# Patient Record
Sex: FEMALE | Race: WHITE | NOT HISPANIC OR LATINO | Employment: FULL TIME | URBAN - METROPOLITAN AREA
[De-identification: names, ages, dates, MRNs, and addresses within clinical notes are randomized per-mention and may not be internally consistent; named-entity substitution may affect disease eponyms.]

---

## 2018-07-20 ENCOUNTER — OFFICE VISIT (OUTPATIENT)
Dept: URGENT CARE | Facility: CLINIC | Age: 33
End: 2018-07-20
Payer: COMMERCIAL

## 2018-07-20 VITALS
RESPIRATION RATE: 16 BRPM | HEART RATE: 92 BPM | HEIGHT: 69 IN | DIASTOLIC BLOOD PRESSURE: 84 MMHG | SYSTOLIC BLOOD PRESSURE: 118 MMHG | BODY MASS INDEX: 40.35 KG/M2 | WEIGHT: 272.4 LBS | OXYGEN SATURATION: 100 % | TEMPERATURE: 97.4 F

## 2018-07-20 DIAGNOSIS — R07.89 CHEST TIGHTNESS: ICD-10-CM

## 2018-07-20 DIAGNOSIS — R05.9 COUGH: ICD-10-CM

## 2018-07-20 PROCEDURE — 99203 OFFICE O/P NEW LOW 30 MIN: CPT | Performed by: NURSE PRACTITIONER

## 2018-07-20 RX ORDER — ALBUTEROL SULFATE 90 UG/1
2 AEROSOL, METERED RESPIRATORY (INHALATION) EVERY 6 HOURS PRN
Qty: 8.5 G | Refills: 1 | Status: SHIPPED | OUTPATIENT
Start: 2018-07-20 | End: 2021-06-16 | Stop reason: SDUPTHER

## 2018-07-20 ASSESSMENT — ENCOUNTER SYMPTOMS
WHEEZING: 1
FEVER: 0
SHORTNESS OF BREATH: 1
CHILLS: 0
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
## 2021-05-06 ENCOUNTER — TELEPHONE (OUTPATIENT)
Dept: SCHEDULING | Facility: IMAGING CENTER | Age: 36
End: 2021-05-06

## 2021-05-07 NOTE — TELEPHONE ENCOUNTER
Letter for medical records is being sent. I will keep a copy of her request just in case a signature is needed.

## 2021-05-25 ENCOUNTER — TELEPHONE (OUTPATIENT)
Dept: MEDICAL GROUP | Facility: PHYSICIAN GROUP | Age: 36
End: 2021-05-25

## 2021-05-27 ENCOUNTER — HOSPITAL ENCOUNTER (OUTPATIENT)
Dept: LAB | Facility: MEDICAL CENTER | Age: 36
End: 2021-05-27
Attending: NURSE PRACTITIONER
Payer: COMMERCIAL

## 2021-05-27 ENCOUNTER — OFFICE VISIT (OUTPATIENT)
Dept: MEDICAL GROUP | Facility: PHYSICIAN GROUP | Age: 36
End: 2021-05-27
Payer: COMMERCIAL

## 2021-05-27 VITALS
BODY MASS INDEX: 43.4 KG/M2 | WEIGHT: 293 LBS | HEART RATE: 78 BPM | RESPIRATION RATE: 16 BRPM | OXYGEN SATURATION: 100 % | TEMPERATURE: 97 F | HEIGHT: 69 IN | DIASTOLIC BLOOD PRESSURE: 80 MMHG | SYSTOLIC BLOOD PRESSURE: 126 MMHG

## 2021-05-27 DIAGNOSIS — J30.2 SEASONAL ALLERGIES: ICD-10-CM

## 2021-05-27 DIAGNOSIS — Z76.89 ENCOUNTER TO ESTABLISH CARE: ICD-10-CM

## 2021-05-27 DIAGNOSIS — E66.01 MORBID OBESITY WITH BMI OF 40.0-44.9, ADULT (HCC): ICD-10-CM

## 2021-05-27 DIAGNOSIS — J45.20 MILD INTERMITTENT ASTHMA WITHOUT COMPLICATION: ICD-10-CM

## 2021-05-27 LAB
ALBUMIN SERPL BCP-MCNC: 4.3 G/DL (ref 3.2–4.9)
ALBUMIN/GLOB SERPL: 1.5 G/DL
ALP SERPL-CCNC: 82 U/L (ref 30–99)
ALT SERPL-CCNC: 27 U/L (ref 2–50)
ANION GAP SERPL CALC-SCNC: 10 MMOL/L (ref 7–16)
AST SERPL-CCNC: 23 U/L (ref 12–45)
BASOPHILS # BLD AUTO: 0.5 % (ref 0–1.8)
BASOPHILS # BLD: 0.03 K/UL (ref 0–0.12)
BILIRUB SERPL-MCNC: 0.5 MG/DL (ref 0.1–1.5)
BUN SERPL-MCNC: 13 MG/DL (ref 8–22)
CALCIUM SERPL-MCNC: 9.2 MG/DL (ref 8.5–10.5)
CHLORIDE SERPL-SCNC: 108 MMOL/L (ref 96–112)
CHOLEST SERPL-MCNC: 145 MG/DL (ref 100–199)
CO2 SERPL-SCNC: 24 MMOL/L (ref 20–33)
CREAT SERPL-MCNC: 0.8 MG/DL (ref 0.5–1.4)
EOSINOPHIL # BLD AUTO: 0.12 K/UL (ref 0–0.51)
EOSINOPHIL NFR BLD: 1.9 % (ref 0–6.9)
ERYTHROCYTE [DISTWIDTH] IN BLOOD BY AUTOMATED COUNT: 46.7 FL (ref 35.9–50)
EST. AVERAGE GLUCOSE BLD GHB EST-MCNC: 100 MG/DL
FASTING STATUS PATIENT QL REPORTED: NORMAL
GLOBULIN SER CALC-MCNC: 2.9 G/DL (ref 1.9–3.5)
GLUCOSE SERPL-MCNC: 82 MG/DL (ref 65–99)
HBA1C MFR BLD: 5.1 % (ref 4–5.6)
HCT VFR BLD AUTO: 43.1 % (ref 37–47)
HDLC SERPL-MCNC: 44 MG/DL
HGB BLD-MCNC: 14.2 G/DL (ref 12–16)
IMM GRANULOCYTES # BLD AUTO: 0.03 K/UL (ref 0–0.11)
IMM GRANULOCYTES NFR BLD AUTO: 0.5 % (ref 0–0.9)
LDLC SERPL CALC-MCNC: 90 MG/DL
LYMPHOCYTES # BLD AUTO: 2.03 K/UL (ref 1–4.8)
LYMPHOCYTES NFR BLD: 32.1 % (ref 22–41)
MCH RBC QN AUTO: 28.4 PG (ref 27–33)
MCHC RBC AUTO-ENTMCNC: 32.9 G/DL (ref 33.6–35)
MCV RBC AUTO: 86.2 FL (ref 81.4–97.8)
MONOCYTES # BLD AUTO: 0.44 K/UL (ref 0–0.85)
MONOCYTES NFR BLD AUTO: 7 % (ref 0–13.4)
NEUTROPHILS # BLD AUTO: 3.67 K/UL (ref 2–7.15)
NEUTROPHILS NFR BLD: 58 % (ref 44–72)
NRBC # BLD AUTO: 0 K/UL
NRBC BLD-RTO: 0 /100 WBC
PLATELET # BLD AUTO: 313 K/UL (ref 164–446)
PMV BLD AUTO: 11.1 FL (ref 9–12.9)
POTASSIUM SERPL-SCNC: 4.6 MMOL/L (ref 3.6–5.5)
PROT SERPL-MCNC: 7.2 G/DL (ref 6–8.2)
RBC # BLD AUTO: 5 M/UL (ref 4.2–5.4)
SODIUM SERPL-SCNC: 142 MMOL/L (ref 135–145)
TRIGL SERPL-MCNC: 54 MG/DL (ref 0–149)
TSH SERPL DL<=0.005 MIU/L-ACNC: 2.43 UIU/ML (ref 0.38–5.33)
WBC # BLD AUTO: 6.3 K/UL (ref 4.8–10.8)

## 2021-05-27 PROCEDURE — 80061 LIPID PANEL: CPT

## 2021-05-27 PROCEDURE — 85025 COMPLETE CBC W/AUTO DIFF WBC: CPT

## 2021-05-27 PROCEDURE — 80053 COMPREHEN METABOLIC PANEL: CPT

## 2021-05-27 PROCEDURE — 84443 ASSAY THYROID STIM HORMONE: CPT

## 2021-05-27 PROCEDURE — 36415 COLL VENOUS BLD VENIPUNCTURE: CPT

## 2021-05-27 PROCEDURE — 99214 OFFICE O/P EST MOD 30 MIN: CPT | Performed by: NURSE PRACTITIONER

## 2021-05-27 PROCEDURE — 83036 HEMOGLOBIN GLYCOSYLATED A1C: CPT

## 2021-05-27 ASSESSMENT — PATIENT HEALTH QUESTIONNAIRE - PHQ9: CLINICAL INTERPRETATION OF PHQ2 SCORE: 0

## 2021-05-27 NOTE — ASSESSMENT & PLAN NOTE
Chronic medical problem.  She is taking cetirizine 10 mg daily.  She will switch up her allergy medications throughout the year.

## 2021-05-27 NOTE — ASSESSMENT & PLAN NOTE
Chronic medical problem.  She started weight watchers in January. She has been able to lose 24 pounds.  When she exercises she is doing walking and weight training.  Her exercise has recently tapered off due to her  being in the hospital.  She is ready to restart her exercise regimen again.  She is watching her diet.
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
## 2021-06-16 ENCOUNTER — PATIENT MESSAGE (OUTPATIENT)
Dept: MEDICAL GROUP | Facility: PHYSICIAN GROUP | Age: 36
End: 2021-06-16

## 2021-06-16 DIAGNOSIS — R07.89 CHEST TIGHTNESS: ICD-10-CM

## 2021-06-16 DIAGNOSIS — R05.9 COUGH: ICD-10-CM

## 2021-06-16 DIAGNOSIS — R06.02 SHORTNESS OF BREATH: ICD-10-CM

## 2021-06-16 RX ORDER — ALBUTEROL SULFATE 90 UG/1
2 AEROSOL, METERED RESPIRATORY (INHALATION) EVERY 6 HOURS PRN
Qty: 8.5 G | Refills: 2 | Status: ON HOLD | OUTPATIENT
Start: 2021-06-16 | End: 2022-01-04

## 2021-06-16 NOTE — PROGRESS NOTES
Requested Prescriptions     Signed Prescriptions Disp Refills   • albuterol 108 (90 Base) MCG/ACT Aero Soln inhalation aerosol 8.5 g 2     Sig: Inhale 2 Puffs every 6 hours as needed.     Authorizing Provider: MYNOR BISWAS A.P.R.N.

## 2021-07-01 ENCOUNTER — HOSPITAL ENCOUNTER (OUTPATIENT)
Facility: MEDICAL CENTER | Age: 36
End: 2021-07-01
Attending: NURSE PRACTITIONER
Payer: COMMERCIAL

## 2021-07-01 ENCOUNTER — OFFICE VISIT (OUTPATIENT)
Dept: MEDICAL GROUP | Facility: PHYSICIAN GROUP | Age: 36
End: 2021-07-01
Payer: COMMERCIAL

## 2021-07-01 VITALS
HEART RATE: 80 BPM | DIASTOLIC BLOOD PRESSURE: 72 MMHG | BODY MASS INDEX: 42.8 KG/M2 | HEIGHT: 69 IN | OXYGEN SATURATION: 99 % | TEMPERATURE: 96.4 F | SYSTOLIC BLOOD PRESSURE: 110 MMHG | WEIGHT: 289 LBS | RESPIRATION RATE: 16 BRPM

## 2021-07-01 DIAGNOSIS — Z12.4 SCREENING FOR MALIGNANT NEOPLASM OF CERVIX: ICD-10-CM

## 2021-07-01 DIAGNOSIS — Z01.419 WELL WOMAN EXAM WITH ROUTINE GYNECOLOGICAL EXAM: ICD-10-CM

## 2021-07-01 DIAGNOSIS — Z23 NEED FOR VACCINATION: ICD-10-CM

## 2021-07-01 PROCEDURE — 90472 IMMUNIZATION ADMIN EACH ADD: CPT | Performed by: NURSE PRACTITIONER

## 2021-07-01 PROCEDURE — 90471 IMMUNIZATION ADMIN: CPT | Performed by: NURSE PRACTITIONER

## 2021-07-01 PROCEDURE — 87624 HPV HI-RISK TYP POOLED RSLT: CPT

## 2021-07-01 PROCEDURE — 90732 PPSV23 VACC 2 YRS+ SUBQ/IM: CPT | Performed by: NURSE PRACTITIONER

## 2021-07-01 PROCEDURE — 88175 CYTOPATH C/V AUTO FLUID REDO: CPT

## 2021-07-01 PROCEDURE — 90715 TDAP VACCINE 7 YRS/> IM: CPT | Performed by: NURSE PRACTITIONER

## 2021-07-01 PROCEDURE — 99395 PREV VISIT EST AGE 18-39: CPT | Mod: 25 | Performed by: NURSE PRACTITIONER

## 2021-07-01 ASSESSMENT — FIBROSIS 4 INDEX: FIB4 SCORE: 0.49

## 2021-07-01 NOTE — PROGRESS NOTES
Subjective:     CC:   Chief Complaint   Patient presents with   • Annual Exam     w/ pap       HPI:   Kathi Guan is a 35 y.o. female who presents for annual exam. She is feeling well and denies any complaints.    Ob-Gyn/ History:    Patient has GYN provider: no  /Para:  4/  Last Pap Smear:  4 year ago. No history of abnormal pap smears.  Gyn Surgery:  no.  Current Contraceptive Method:  Vasectomy. Is currently sexually active.  Last menstrual period:  2021.  Periods regular. heavy bleeding. Cramping is moderate.   She does take OTC analgesics for cramps.  No significant bloating/fluid retention, pelvic pain, or dyspareunia. No vaginal discharge  Post-menopausal bleeding: n/a  Urinary incontinence: No  Folate intake: not taking     Health Maintenance  Cholesterol Screening: Completed 2021.    Diabetes Screening: Fasting glucose 82 on    Diet: She is eating lean proteins, low carbs, whole foods and grains. Occasional soda.    Exercise: She was exercising 5 days a week at the gym and the other days she is walking.    Substance Abuse: discussed and reviewed   Safe in relationship.   Seat belts safety discussed.  Sun protection used.    Cancer screening  Cervical Cancer Screenin years ago, due today.     Infectious disease screening/Immunizations  --Practices safe sex.  --Immunizations:    Influenza: Due fall     Tetanus: Due today    Shingles: n/a    Pneumococcal : Due today   Other immunizations: Completed [employer] Covid vaccine     She  has a past medical history of Asthma. She also has no past medical history of Diabetes (HCC), Hyperlipidemia, Hypertension, Kidney disease, or Thyroid disease.  She  has no past surgical history on file.    Family History   Problem Relation Age of Onset   • No Known Problems Mother    • Hyperlipidemia Father    • Hypertension Father    • No Known Problems Sister    • Cancer Paternal Aunt         breast cancer   • Diabetes Paternal Uncle     • Cancer Maternal Grandmother         bone   • Asthma Maternal Grandmother    • Arthritis Maternal Grandmother    • Dementia Paternal Grandmother    • Hypertension Paternal Grandmother    • Diabetes Paternal Grandmother    • Hyperlipidemia Paternal Grandmother    • Stroke Paternal Grandmother    • Heart Disease Paternal Grandfather         MI x 4   • No Known Problems Half-brother    • No Known Problems Son        Social History     Socioeconomic History   • Marital status:      Spouse name: Not on file   • Number of children: Not on file   • Years of education: Not on file   • Highest education level: Not on file   Occupational History   • Not on file   Tobacco Use   • Smoking status: Never Smoker   • Smokeless tobacco: Never Used   Vaping Use   • Vaping Use: Never used   Substance and Sexual Activity   • Alcohol use: Yes     Comment: 3-5 drinks per week, a little of everything   • Drug use: Yes     Types: Marijuana, Inhaled     Comment: occasionally   • Sexual activity: Yes     Partners: Male     Birth control/protection: Male Sterilization   Other Topics Concern   • Not on file   Social History Narrative   • Not on file     Social Determinants of Health     Financial Resource Strain:    • Difficulty of Paying Living Expenses:    Food Insecurity:    • Worried About Running Out of Food in the Last Year:    • Ran Out of Food in the Last Year:    Transportation Needs:    • Lack of Transportation (Medical):    • Lack of Transportation (Non-Medical):    Physical Activity:    • Days of Exercise per Week:    • Minutes of Exercise per Session:    Stress:    • Feeling of Stress :    Social Connections:    • Frequency of Communication with Friends and Family:    • Frequency of Social Gatherings with Friends and Family:    • Attends [employer] Services:    • Active Member of Clubs or Organizations:    • Attends Club or Organization Meetings:    • Marital Status:    Intimate Partner Violence:    • Fear of Current or  "Ex-Partner:    • Emotionally Abused:    • Physically Abused:    • Sexually Abused:        Patient Active Problem List    Diagnosis Date Noted   • Mild intermittent asthma without complication 05/27/2021   • Seasonal allergies 05/27/2021   • Morbid obesity with BMI of 40.0-44.9, adult ([facility]) 05/27/2021         Current Outpatient Medications   Medication Sig Dispense Refill   • albuterol 108 (90 Base) MCG/ACT Aero Soln inhalation aerosol Inhale 2 Puffs every 6 hours as needed. 8.5 g 2   • Cetirizine HCl (ZYRTEC ALLERGY PO) Take  by mouth.       No current facility-administered medications for this visit.     Allergies   Allergen Reactions   • Penicillins Hives       Review of Systems   Constitutional: Negative for fever, chills and fatigue.   HENT: Negative for congestion.    Eyes: Negative for pain.   Respiratory: Negative for cough and shortness of breath.    Cardiovascular: Negative for leg swelling.   Gastrointestinal: Negative for nausea, vomiting, abdominal pain and diarrhea.   Genitourinary: Negative for dysuria and hematuria.   Skin: Negative for rash.   Neurological: Negative for dizziness, focal weakness and headaches.   Endo/Heme/Allergies: Does not bruise/bleed easily.   Psychiatric/Behavioral: Negative for depression.  The patient is not nervous/anxious.      Objective:   Vital signs reviewed  /72 (BP Location: Right arm, Patient Position: Sitting, BP Cuff Size: Adult)   Pulse 80   Temp (!) 35.8 °C (96.4 °F) (Temporal)   Resp 16   Ht 1.753 m (5' 9\")   Wt (!) 131 kg (289 lb)   SpO2 99%   BMI 42.68 kg/m²   Body mass index is 42.68 kg/m².  Wt Readings from Last 4 Encounters:   07/01/21 (!) 131 kg (289 lb)   05/27/21 (!) 134 kg (296 lb)   07/20/18 124 kg (272 lb 6.4 oz)   03/08/15 104 kg (229 lb)       Physical Exam:  Constitutional: Well-developed and well-nourished. Not diaphoretic. No distress.   Skin: Skin is warm and dry. No rash noted.  Head: Atraumatic without lesions.  Eyes: Conjunctivae " and extraocular motions are normal. Pupils are equal, round, and reactive to light. No scleral icterus.   Ears:  External ears unremarkable. Tympanic membranes clear and intact.  Nose: Deferred due to COVID-19.   Mouth/Throat: Deferred due to COVID-19.  Neck: Supple, trachea midline. Normal range of motion. No thyromegaly present. No lymphadenopathy--cervical or supraclavicular.  Cardiovascular: Regular rate and rhythm, S1 and S2 without murmur, rubs, or gallops.  Radial pulses 2+ bilaterally.  Lungs: Normal inspiratory effort, CTA bilaterally, no wheezes/rhonchi/rales  Breast: Breasts examined seated and supine. No skin changes, peau d'orange or nipple retraction. No discharge. No axillary or supraclavicular adenopathy. No masses or nodularity palpable.   Abdomen: Soft, non tender, and without distention. Active bowel sounds in all four quadrants. No rebound, guarding, masses or HSM.  :Perineum and external genitalia normal without rash. Vagina with normal and physiologic with scattered scant white and curd-like discharge. Cervix without visible lesions or discharge. Bimanual exam without adnexal masses or cervical motion tenderness.  Extremities: No cyanosis, clubbing, erythema, nor edema. Distal pulses intact and symmetric.   Musculoskeletal: All major joints AROM full in all directions without pain.  Neurological: Alert and oriented x 3. No cranial nerve deficit. 5/5 myotomes. Sensation intact.   Psychiatric:  Behavior, mood, and affect are appropriate.    A chaperone was offered to the patient during today's exam. Patient declined chaperone.    Assessment and Plan:     1. Well woman exam with routine gynecological exam  2. Screening for malignant neoplasm of cervix  Acute uncomplicated problem.  Well woman exam completed today.  Pap collected today.  We will follow-up with results via [facility].  - THINPREP PAP WITH HPV; Future    3. Need for vaccination  Acute uncomplicated problem.  Vaccine indicated.   Patient is in agreement. I have placed the below orders and discussed them with an approved delegating provider.  The MA is performing the below orders under the direction of Dr. Olivera.  - Tdap Vaccine =>6YO IM  - Pneumococal Polysaccharide Vaccine 23-Valent =>3YO SQ/IM      HCM: Due for Pap, Tdap, and pneumococcal vaccine  Labs per orders  Immunizations per orders  Patient counseled about skin care, diet, supplements, prenatal vitamins, safe sex and exercise.    Follow-up: Return in about 1 year (around 7/1/2022).     Please note that this dictation was created using voice recognition software. I have made every reasonable attempt to correct obvious errors, but I expect that there are errors of grammar and possibly content that I did not discover before finalizing the note.

## 2021-07-02 LAB
CYTOLOGY REG CYTOL: NORMAL
HPV HR 12 DNA CVX QL NAA+PROBE: NEGATIVE
HPV16 DNA SPEC QL NAA+PROBE: NEGATIVE
HPV18 DNA SPEC QL NAA+PROBE: NEGATIVE
SPECIMEN SOURCE: NORMAL

## 2021-07-05 NOTE — RESULT ENCOUNTER NOTE
Kathi Guan,    Your PAP smear results are normal and do not need another PAP smear for 5years.    Mitchell Donnelly MD

## 2021-07-23 ENCOUNTER — HOSPITAL ENCOUNTER (OUTPATIENT)
Dept: RADIOLOGY | Facility: MEDICAL CENTER | Age: 36
End: 2021-07-23
Attending: FAMILY MEDICINE
Payer: COMMERCIAL

## 2021-07-23 ENCOUNTER — OFFICE VISIT (OUTPATIENT)
Dept: URGENT CARE | Facility: PHYSICIAN GROUP | Age: 36
End: 2021-07-23
Payer: COMMERCIAL

## 2021-07-23 VITALS
HEIGHT: 69 IN | HEART RATE: 90 BPM | DIASTOLIC BLOOD PRESSURE: 82 MMHG | RESPIRATION RATE: 20 BRPM | TEMPERATURE: 97.7 F | OXYGEN SATURATION: 98 % | WEIGHT: 289 LBS | SYSTOLIC BLOOD PRESSURE: 132 MMHG | BODY MASS INDEX: 42.8 KG/M2

## 2021-07-23 DIAGNOSIS — S93.401A SPRAIN OF RIGHT ANKLE, UNSPECIFIED LIGAMENT, INITIAL ENCOUNTER: ICD-10-CM

## 2021-07-23 DIAGNOSIS — S82.301A CLOSED EXTRA-ARTICULAR FRACTURE OF DISTAL END OF RIGHT TIBIA, INITIAL ENCOUNTER: ICD-10-CM

## 2021-07-23 PROCEDURE — 73610 X-RAY EXAM OF ANKLE: CPT | Mod: RT

## 2021-07-23 PROCEDURE — 29405 APPL SHORT LEG CAST: CPT | Performed by: FAMILY MEDICINE

## 2021-07-23 PROCEDURE — 73630 X-RAY EXAM OF FOOT: CPT | Mod: RT

## 2021-07-23 PROCEDURE — 99214 OFFICE O/P EST MOD 30 MIN: CPT | Mod: 25 | Performed by: FAMILY MEDICINE

## 2021-07-23 RX ORDER — HYDROCODONE BITARTRATE AND ACETAMINOPHEN 5; 325 MG/1; MG/1
1 TABLET ORAL EVERY 8 HOURS PRN
Qty: 5 TABLET | Refills: 0 | Status: SHIPPED | OUTPATIENT
Start: 2021-07-23 | End: 2021-07-30

## 2021-07-23 ASSESSMENT — FIBROSIS 4 INDEX: FIB4 SCORE: 0.49

## 2021-07-23 NOTE — PROGRESS NOTES
"Subjective:      Chief Complaint   Patient presents with   • Ankle Pain     right; Pt states that she was walking and heard a pop; 1.5x hr ago             Ankle Injury   The incident occurred at approximatelely 0800 today.       She reports that she was walking down the   hallway at work and slipped and rolled rt foot and heard an audible \"pop\" and fell to the ground.             Denies head trauma or LOC.   The injury mechanism was an inversion injury. The pain is present in the right ankle. The pain is moderate. The pain has been constant since onset. Pertinent negatives include no   loss of motion, muscle weakness, numbness or tingling. The symptoms are aggravated by weight bearing and palpation. pt has tried acetaminophen for the symptoms. The treatment provided mild relief.       Social History     Tobacco Use   • Smoking status: Never Smoker   • Smokeless tobacco: Never Used   Vaping Use   • Vaping Use: Never used   Substance Use Topics   • Alcohol use: Yes     Comment: 3-5 drinks per week, a little of everything   • Drug use: Yes     Types: Marijuana, Inhaled     Comment: occasionally         Past Medical History:   Diagnosis Date   • Asthma          Review of Systems   Constitutional: Negative for fever.   Respiratory: Negative for shortness of breath.    Cardiovascular: Negative for chest pain.   Neurological: Negative for tingling and numbness.   All other systems reviewed and are negative.         Objective:     /82 (BP Location: Right arm, Patient Position: Sitting, BP Cuff Size: Adult)   Pulse 90   Temp 36.5 °C (97.7 °F) (Temporal)   Resp 20   Ht 1.753 m (5' 9\")   Wt (!) 131 kg (289 lb)   SpO2 98%     Physical Exam   Constitutional: pt is oriented to person, place, and time. Pt appears well-developed. No distress.   HENT:   Head: Normocephalic and atraumatic.   Eyes: Conjunctivae are normal.   Cardiovascular: Normal rate and regular rhythm.    Pulmonary/Chest: Effort normal and breath sounds " normal.   Musculoskeletal:        Rt ankle: pt exhibits swelling and ecchymosis. Pt exhibits normal range of motion. Tenderness. Lateral malleolus tenderness found. Achilles tendon normal.        Rt foot: There is normal range of motion, normal capillary refill and no crepitus.   Neurological: pt is alert and oriented to person, place, and time. No cranial nerve deficit.   Skin: Skin is warm. Pt is not diaphoretic. No erythema.   Psychiatric: His behavior is normal.   Nursing note and vitals reviewed.    Details    Reading Physician Reading Date Result Priority   Vince Jovel M.D.  [phone] 7/23/2021 Ur[facility]      Narrative & Impression     7/23/2021 11:08 AM     HISTORY/REASON FOR EXAM:  pain; Pain/Deformity Following Trauma.  Rolled RIGHT ankle.  Unable to bear weight.     TECHNIQUE/EXAM DESCRIPTION AND NUMBER OF VIEWS:  3 views of the RIGHT ankle.     COMPARISON: None.     FINDINGS:  Lateral soft tissue swelling over the distal fibula.  Apparent slight irregularity of the tibial plafond posterior.  Mortise is congruent.  No dislocation.     IMPRESSION:     1.  Possible osteochondral fracture of the RIGHT tibial plafond posteriorly.  2.  Lateral soft tissue swelling.  3.  No dislocation.                           Assessment/Plan:        1. Closed extra-articular fracture of distal end of right tibia, initial encounter    X-rays were personally reviewed by myself.   There is a distal tib fxr, as described above         - HYDROcodone-acetaminophen (NORCO) 5-325 MG Tab per tablet; Take 1 tablet by mouth every 8 hours as needed for up to 7 days.  Dispense: 5 tablet; Refill: 0        Posterior, sugar tong splint applied by myself    NWB on rt - pt was given crutches.     - REFERRAL TO SPORTS MEDICINE    This injury occurred at work.   She has declined to file worker's compensation claim.    I advised her that her insurance may potentially deny the claim.   Pt voiced understanding.         My total time spent  caring for the patient on the day of the encounter was at least 45 minutes.   This does not include time spent on separately billable procedures/tests.

## 2021-08-12 ENCOUNTER — OCCUPATIONAL MEDICINE (OUTPATIENT)
Dept: OCCUPATIONAL MEDICINE | Facility: CLINIC | Age: 36
End: 2021-08-12
Payer: COMMERCIAL

## 2021-08-12 VITALS
BODY MASS INDEX: 42.8 KG/M2 | RESPIRATION RATE: 14 BRPM | OXYGEN SATURATION: 98 % | HEIGHT: 69 IN | DIASTOLIC BLOOD PRESSURE: 82 MMHG | HEART RATE: 110 BPM | SYSTOLIC BLOOD PRESSURE: 122 MMHG | WEIGHT: 289 LBS

## 2021-08-12 DIAGNOSIS — S82.301D CLOSED EXTRA-ARTICULAR FRACTURE OF DISTAL END OF RIGHT TIBIA WITH ROUTINE HEALING, SUBSEQUENT ENCOUNTER: ICD-10-CM

## 2021-08-12 PROCEDURE — 99213 OFFICE O/P EST LOW 20 MIN: CPT | Performed by: NURSE PRACTITIONER

## 2021-08-12 ASSESSMENT — FIBROSIS 4 INDEX: FIB4 SCORE: 0.49

## 2021-08-12 NOTE — LETTER
"[address],   [address] [address]  Phone:  [phone]- Fax:  [phone]   Occupational Health [facility] Progress Report and Disability Certification  Date of Service: 8/12/2021   No Show:  No  Date / Time of Next Visit: 9/3/2021 @ 7:30 AM    Claim Information   Patient Name: Kathi Guan  Claim Number:     Employer:   [employer] Date of Injury: 7/23/2021     Insurer / TPA: Employers Insurance  ID / SSN:     Occupation:   Diagnosis: The encounter diagnosis was Closed extra-articular fracture of distal end of right tibia with routine healing, subsequent encounter.    Medical Information   Related to Industrial Injury? Yes    Subjective Complaints:  DOI 7/23/21: She reports that she was walking down the hallway at work and slipped and rolled rt foot and heard an audible \"pop\" and fell to the ground.  Patient was seen at urgent care x1 in [facility].  Diagnostic imaging at urgent care positive for distal fracture.  Since injury he said no real change in symptoms.  She states the pain is intermittent, sharp, unable to bear weight, stabbing, throbbing, and intermittent swelling.  She denies numbness, tingling, or overall weakness in the joint.  She has been taking ibuprofen as needed with moderate relief.  She is elevating with moderate relief of swelling.  She has been icing intermittently if needed.  She is currently ambulating with crutches and a scooter that her coworker provided.  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  Orthopedic referral placed at this visit for further evaluation and management.  Plan of care discussed with patient.    Objective Findings: Right ankle: That of soft tissue swelling and ecchymosis. Pt exhibits normal range of motion.  Moderate tenderness.  Moderate lateral malleolus tenderness found. Achilles tendon normal.  Antalgic " gait noted.  Right foot: There is normal range of motion, normal capillary refill and no crepitus.  Patient is able to wiggle her toes without difficulty.  Antalgic gait noted ambulating with crutches. No cranial nerve deficit.     Right ankle Xray 7/23/21:   FINDINGS:  Lateral soft tissue swelling over the distal fibula.  Apparent slight irregularity of the tibial plafond posterior.  Mortise is congruent.  No dislocation.     IMPRESSION:     1.  Possible osteochondral fracture of the RIGHT tibial plafond posteriorly.  2.  Lateral soft tissue swelling.  3.  No dislocation.   Pre-Existing Condition(s):     Assessment:   Condition Same    Status: Discharged / Care Transfer  Permanent Disability:No    Plan: Transfer Care    Diagnostics:      Comments:  Follow-up in 3 weeks if not seen by orthopedics  Restricted duty, per orthopedics  Orthopedic referral placed, transfer care  Recommend continue with OTC Tylenol/ibuprofen, ice application, and elevation  Recommend continue non weightbearing until cl  eared by orthopedics  Recommend use of crutches while walking or standing    Disability Information   Status: Released to Restricted Duty    From:  8/12/2021  Through: 9/3/2021 Restrictions are: Temporary   Physical Restrictions   Sitting:    Standing:  < or = to 1 hr/day Stooping:    Bending:      Squatting:    Walking:  < or = to 1 hr/day Climbing:    Pushing:      Pulling:    Other:    Reaching Above Shoulder (L):   Reaching Above Shoulder (R):       Reaching Below Shoulder (L):    Reaching Below Shoulder (R):      Not to exceed Weight Limits   Carrying(hrs):   Weight Limit(lb): < or = to 10 pounds Lifting(hrs):   Weight  Limit(lb): < or = to 10 pounds   Comments: Recommend sedentary job duties until cleared by orthopedics.  Must ambulate with crutches.    Repetitive Actions   Hands: i.e. Fine Manipulations from Grasping:     Feet: i.e. Operating Foot Controls:     Driving / Operate Machinery:     Provider Name:    JET Buchanan Physician Signature:  Physician Name:     Clinic Name / Location: [address],   [address]  [place] [address] Clinic Phone Number: Dept: [phone]   Appointment Time: 7:45 Am Visit Start Time: 7:56 AM   Check-In Time:  7:57 Am Visit Discharge Time: 8:30 AM    Original-Treating Physician or Chiropractor    Page 2-Insurer/TPA    Page 3-Employer    Page 4-Employee

## 2021-08-12 NOTE — PROGRESS NOTES
"Subjective:     Kathi Guan is a 35 y.o. female who presents for No chief complaint on file.      DOI 7/23/21: She reports that she was walking down the hallway at work and slipped and rolled rt foot and heard an audible \"pop\" and fell to the ground.  Patient was seen at urgent care x1 in [place].  Diagnostic imaging at urgent care positive for distal fracture.  Since injury he said no real change in symptoms.  She states the pain is intermittent, sharp, unable to bear weight, stabbing, throbbing, and intermittent swelling.  She denies numbness, tingling, or overall weakness in the joint.  She has been taking ibuprofen as needed with moderate relief.  She is elevating with moderate relief of swelling.  She has been icing intermittently if needed.  She is currently ambulating with crutches and a scooter that her coworker provided.  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  Orthopedic referral placed at this visit for further evaluation and management.  Plan of care discussed with patient.     ROS: All systems were reviewed on intake form, form was reviewed and signed. See scanned documents in media. Pertinent positives and negatives included in HPI.    PMH: No pertinent past medical history to this problem  MEDS: Medications were reviewed in Epic  ALLERGIES:   Allergies   Allergen Reactions   • Penicillins Hives     SOCHX: Works as  at [employer]  FH: No pertinent family history to this problem       Objective:     /82   Pulse (!) 110   Resp 14   Ht 1.753 m (5' 9\")   Wt (!) 131 kg (289 lb)   SpO2 98%   BMI 42.68 kg/m²     [unfilled]    Right ankle: That of soft tissue swelling and ecchymosis. Pt exhibits normal range of motion.  Moderate tenderness.  Moderate lateral malleolus tenderness found. Achilles tendon normal.  Antalgic gait noted.  Right foot: There is normal range of motion, normal capillary refill and " no crepitus.  Patient is able to wiggle her toes without difficulty.  Antalgic gait noted ambulating with crutches. No cranial nerve deficit.     Right ankle Xray 7/23/21:   FINDINGS:  Lateral soft tissue swelling over the distal fibula.  Apparent slight irregularity of the tibial plafond posterior.  Mortise is congruent.  No dislocation.     IMPRESSION:     1.  Possible osteochondral fracture of the RIGHT tibial plafond posteriorly.  2.  Lateral soft tissue swelling.  3.  No dislocation.    Assessment/Plan:       1. Closed extra-articular fracture of distal end of right tibia with routine healing, subsequent encounter  - REFERRAL TO ORTHOPEDICS    Released to Restricted Duty FROM 8/12/2021 TO 9/3/2021  Recommend sedentary job duties until cleared by orthopedics.  Must ambulate with crutches.  Follow-up in 3 weeks if not seen by orthopedics  Restricted duty, per orthopedics  Orthopedic referral placed, transfer care  Recommend continue with OTC Tylenol/ibuprofen, ice application, and elevation  Recommend continue non weightbearing until cl  eared by orthopedics  Recommend use of crutches while walking or standing    Differential diagnosis, natural history, supportive care, and indications for immediate follow-up discussed.    Approximately 25 minutes were spent in reviewing notes, preparing for visit, obtaining history, exam and evaluation, patient counseling/education and post visit documentation/orders.

## 2021-08-12 NOTE — LETTER
"EMPLOYEE’S CLAIM FOR COMPENSATION/ REPORT OF INITIAL TREATMENT  FORM C-4    EMPLOYEE’S CLAIM - PROVIDE ALL INFORMATION REQUESTED   First Name  Kathi Last Name  Freida Birthdate                    1985                Sex  female Claim Number   Home Address  [place]. Age  35 y.o. Height  1.753 m (5' 9\") Weight  (!) 131 kg (289 lb) [facility]     [facility] Zip  [address] Telephone  [phone] (home)    Mailing Address  [address] [address] [facility] Zip  [address] Primary Language Spoken  English    Insurer  EMPLOYERS Third Party   EMPLOYERS   Employee's Occupation (Job Title) When Injury or Occupational Disease Occurred      Employer's Name    [employer] Telephone   [phone]   Employer Address   [address] [facility] Zip   [address]     Date of Injury  7/23/2021               Hour of Injury  8:15 AM Date Employer Notified  7/23/2021 Last Day of Work after Injury     or Occupational Disease   Supervisor to Whom Injury     Reported  CHERRY WARNER   Address or Location of Accident (if applicable)  [[address] [facility] [address]]   What were you doing at the time of accident? (if applicable)  WALKING DOWN HALLWAY    How did this injury or occupational disease occur? (Be specific an answer in detail. Use additional sheet if necessary)  I WALKED OUT OF MY OFFICE TO GO INTO [employer]ERRY [employer]ER, , OFFICE NEXT DOOR WHEN I ROLLED MY ANKLE HEARD IT POP AND FELL.   If you believe that you have an occupational disease, when did you first have knowledge of the disability and it relationship to your employment?  N.A Witnesses to the Accident  JAQUI BAUTISTA      Nature of Injury or Occupational Disease  Fracture  Part(s) of Body Injured or Affected  Ankle (R), ,     I certify that the above is true and correct to the best of my " knowledge and that I have provided this information in order to obtain the benefits of [place]  I hereby authorize any physician, chiropractor, surgeon, practitioner, or other person, any hospital, including [facility], any medical service organization, any insurance company, or other institution or organization to release to each other, any medical or other information, including benefits paid or payable, pertinent to this injury or disease, except information relative to diagnosis, treatment and/or counseling for AIDS, psychological conditions, alcohol or controlled substances, for which I must give specific authorization.  A Photostat of this authorization shall be as valid as the original.     Date: 08/12/21   Place: [facility]   Employee’s Signature   THIS REPORT MUST BE COMPLETED AND MAILED WITHIN 3 WORKING DAYS OF TREATMENT   Place  [facility]  Name of [facility]   Date  8/12/2021 Diagnosis  (S82.301D) Closed extra-articular fracture of distal end of right tibia with routine healing, subsequent encounter Is there evidence the injured employee was under the              influence of alcohol and/or another controlled substance at the time of accident?   Hour  7:56 AM Description of Injury or Disease  The encounter diagnosis was Closed extra-articular fracture of distal end of right tibia with routine healing, subsequent encounter. No   Treatment  Orthopedic referral indicated   Have you advised the patient to remain off work five days or     more? No   X-Ray Findings  Positive  Comments:See D39   If Yes   From Date  To Date      From information given by the employee, together with medical evidence, can you directly connect this injury or occupational disease as job incurred?  Yes If No Full Duty    No Modified Duty  Yes   Is  "additional medical care by a physician indicated?  Yes If Modified Duty, Specify any Limitations / Restrictions  See D39   Do you know of any previous injury or disease contributing to this condition or occupational disease?                            No   Date  8/12/2021 Print Doctor’s Name   JET Buchanan I certify the employer’s copy of  this form was mailed on:   Address  [address] [place] [address],   [address] Insurer’s Use Only     [facility]  [address]    Provider’s Tax ID Number  [id number] Telephone  Dept: [phone]      e-DANYELLE Lewis  Signature:     Degree          ORIGINAL-TREATING PHYSICIAN OR CHIROPRACTOR    PAGE 2-INSURER/TPA    PAGE 3-EMPLOYER    PAGE 4-EMPLOYEE        Form C-4 (rev.10/07)           BRIEF DESCRIPTION OF RIGHTS AND BENEFITS  (Pursuant to [place] 616C.050)    Notice of Injury or Occupational Disease (Incident Report Form C-1): If an injury or occupational disease (OD) arises out of and in the course of employment, you must provide written notice to your employer as soon as practicable, but no later than 7 days after the accident or OD. Your employer shall maintain a sufficient supply of the required forms.    Claim for Compensation (Form C-4): If medical treatment is sought, the form C-4 is available at the place of initial treatment. A completed \"Claim for Compensation\" (Form C-4) must be filed within 90 days after an accident or OD. The treating physician or chiropractor must, within 3 working days after treatment, complete and mail to the employer, the employer's insurer and third-party , the Claim for Compensation.    Medical Treatment: If you require medical treatment for your on-the-job injury or OD, you may be required to select a physician or chiropractor from a list provided by your workers’ compensation insurer, if it has contracted with an Organization for Managed Care (MCO) or Preferred Provider Organization (PPO) or providers of health " care. If your employer has not entered into a contract with an MCO or PPO, you may select a physician or chiropractor from the Panel of Physicians and Chiropractors. Any medical costs related to your industrial injury or OD will be paid by your insurer.    Temporary Total Disability (TTD): If your doctor has certified that you are unable to work for a period of at least 5 consecutive days, or 5 cumulative days in a 20-day period, or places restrictions on you that your employer does not accommodate, you may be entitled to TTD compensation.    Temporary Partial Disability (TPD): If the wage you receive upon reemployment is less than the compensation for TTD to which you are entitled, the insurer may be required to pay you TPD compensation to make up the difference. TPD can only be paid for a maximum of 24 months.    Permanent Partial Disability (PPD): When your medical condition is stable and there is an indication of a PPD as a result of your injury or OD, within 30 days, your insurer must arrange for an evaluation by a rating physician or chiropractor to determine the degree of your PPD. The amount of your PPD award depends on the date of injury, the results of the PPD evaluation, your age and wage.    Permanent Total Disability (PTD): If you are medically certified by a treating physician or chiropractor as permanently and totally disabled and have been granted a PTD status by your insurer, you are entitled to receive monthly benefits not to exceed 66 2/3% of your average monthly wage. The amount of your PTD payments is subject to reduction if you previously received a lump-sum PPD award.    Vocational Rehabilitation Services: You may be eligible for vocational rehabilitation services if you are unable to return to the job due to a permanent physical impairment or permanent restrictions as a result of your injury or occupational disease.    Transportation and Per Nicolasa Reimbursement: You may be eligible for travel  expenses and per alda associated with medical treatment.    Reopening: You may be able to reopen your claim if your condition worsens after claim closure.     Appeal Process: If you disagree with a written determination issued by the insurer or the insurer does not respond to your request, you may appeal to the Department of Administration, , by following the instructions contained in your determination letter. You must appeal the determination within 70 days from the date of the determination letter at [address], [address], [place], [place] [address], or [address], [place], [place] [address]. If you disagree with the  decision, you may appeal to the Department of Administration, . You must file your appeal within 30 days from the date of the  decision letter at [address], [address], [place], [place] [address], or [address], [place], [place] [address]. If you disagree with a decision of an , you may file a petition for judicial review with the District Court. You must do so within 30 days of the Appeal Officer’s decision. You may be represented by an  at your own expense or you may contact the [facility] for possible representation.    [place]  for Injured Workers (NAIW): If you disagree with a  decision, you may request that NAIW represent you without charge at an  Hearing. For information regarding denial of benefits, you may contact the [facility] at: [address], [address], [place] [address], [phone], or [address], [place] [address], [phone]    To File a Complaint with the Division: If you wish to file a complaint with the  of the Division of Industrial Relations (DIR),  please contact the Workers’ Compensation Section, [address], [address], [place], [place] [address],  telephone (301) [phone], or [address], [address], [place], [place] [address], telephone [phone].    For assistance with Workers’ Compensation Issues: You may contact the [facility] Office for Consumer Health Assistance, [address], [address], [place] [address], Toll Free 1-171.761.5576, Web site: http://[facility].[place].gov/Programs/ALEX E-mail: [email or web address]              __________________________________________________________________                                    ____08/12/21____            Employee Name / Signature                                                                                                                            Date                                                                                                                                                                                                                              D-2 (rev. 10/20)

## 2021-09-29 ENCOUNTER — APPOINTMENT (OUTPATIENT)
Dept: RADIOLOGY | Facility: MEDICAL CENTER | Age: 36
End: 2021-09-29
Attending: EMERGENCY MEDICINE
Payer: COMMERCIAL

## 2021-09-29 ENCOUNTER — HOSPITAL ENCOUNTER (EMERGENCY)
Facility: MEDICAL CENTER | Age: 36
End: 2021-09-29
Attending: EMERGENCY MEDICINE
Payer: COMMERCIAL

## 2021-09-29 VITALS
OXYGEN SATURATION: 98 % | HEART RATE: 85 BPM | HEIGHT: 70 IN | TEMPERATURE: 97.6 F | BODY MASS INDEX: 41.35 KG/M2 | SYSTOLIC BLOOD PRESSURE: 119 MMHG | WEIGHT: 288.8 LBS | DIASTOLIC BLOOD PRESSURE: 79 MMHG | RESPIRATION RATE: 20 BRPM

## 2021-09-29 DIAGNOSIS — I26.94 MULTIPLE SUBSEGMENTAL PULMONARY EMBOLI WITHOUT ACUTE COR PULMONALE (HCC): ICD-10-CM

## 2021-09-29 DIAGNOSIS — R07.81 PLEURITIC CHEST PAIN: ICD-10-CM

## 2021-09-29 LAB
ALBUMIN SERPL BCP-MCNC: 3.7 G/DL (ref 3.2–4.9)
ALBUMIN/GLOB SERPL: 1.2 G/DL
ALP SERPL-CCNC: 76 U/L (ref 30–99)
ALT SERPL-CCNC: 22 U/L (ref 2–50)
ANION GAP SERPL CALC-SCNC: 13 MMOL/L (ref 7–16)
APTT PPP: 30 SEC (ref 24.7–36)
AST SERPL-CCNC: 21 U/L (ref 12–45)
BASOPHILS # BLD AUTO: 0.5 % (ref 0–1.8)
BASOPHILS # BLD: 0.04 K/UL (ref 0–0.12)
BILIRUB SERPL-MCNC: 0.8 MG/DL (ref 0.1–1.5)
BUN SERPL-MCNC: 14 MG/DL (ref 8–22)
CALCIUM SERPL-MCNC: 9.1 MG/DL (ref 8.4–10.2)
CHLORIDE SERPL-SCNC: 105 MMOL/L (ref 96–112)
CO2 SERPL-SCNC: 21 MMOL/L (ref 20–33)
CREAT SERPL-MCNC: 0.79 MG/DL (ref 0.5–1.4)
EKG IMPRESSION: NORMAL
EOSINOPHIL # BLD AUTO: 0.49 K/UL (ref 0–0.51)
EOSINOPHIL NFR BLD: 5.9 % (ref 0–6.9)
ERYTHROCYTE [DISTWIDTH] IN BLOOD BY AUTOMATED COUNT: 47.1 FL (ref 35.9–50)
GLOBULIN SER CALC-MCNC: 3.2 G/DL (ref 1.9–3.5)
GLUCOSE SERPL-MCNC: 98 MG/DL (ref 65–99)
HCG SERPL QL: NEGATIVE
HCT VFR BLD AUTO: 42.6 % (ref 37–47)
HGB BLD-MCNC: 13.8 G/DL (ref 12–16)
IMM GRANULOCYTES # BLD AUTO: 0.02 K/UL (ref 0–0.11)
IMM GRANULOCYTES NFR BLD AUTO: 0.2 % (ref 0–0.9)
INR PPP: 1.17 (ref 0.87–1.13)
LYMPHOCYTES # BLD AUTO: 2.18 K/UL (ref 1–4.8)
LYMPHOCYTES NFR BLD: 26.4 % (ref 22–41)
MCH RBC QN AUTO: 28.6 PG (ref 27–33)
MCHC RBC AUTO-ENTMCNC: 32.4 G/DL (ref 33.6–35)
MCV RBC AUTO: 88.4 FL (ref 81.4–97.8)
MONOCYTES # BLD AUTO: 0.73 K/UL (ref 0–0.85)
MONOCYTES NFR BLD AUTO: 8.8 % (ref 0–13.4)
NEUTROPHILS # BLD AUTO: 4.8 K/UL (ref 2–7.15)
NEUTROPHILS NFR BLD: 58.2 % (ref 44–72)
NRBC # BLD AUTO: 0 K/UL
NRBC BLD-RTO: 0 /100 WBC
PLATELET # BLD AUTO: 301 K/UL (ref 164–446)
PMV BLD AUTO: 10.3 FL (ref 9–12.9)
POTASSIUM SERPL-SCNC: 4.3 MMOL/L (ref 3.6–5.5)
PROT SERPL-MCNC: 6.9 G/DL (ref 6–8.2)
PROTHROMBIN TIME: 14 SEC (ref 12–14.6)
RBC # BLD AUTO: 4.82 M/UL (ref 4.2–5.4)
SODIUM SERPL-SCNC: 139 MMOL/L (ref 135–145)
TROPONIN T SERPL-MCNC: <6 NG/L (ref 6–19)
WBC # BLD AUTO: 8.3 K/UL (ref 4.8–10.8)

## 2021-09-29 PROCEDURE — 84703 CHORIONIC GONADOTROPIN ASSAY: CPT

## 2021-09-29 PROCEDURE — 85730 THROMBOPLASTIN TIME PARTIAL: CPT

## 2021-09-29 PROCEDURE — 96374 THER/PROPH/DIAG INJ IV PUSH: CPT

## 2021-09-29 PROCEDURE — 93005 ELECTROCARDIOGRAM TRACING: CPT

## 2021-09-29 PROCEDURE — 700117 HCHG RX CONTRAST REV CODE 255: Performed by: EMERGENCY MEDICINE

## 2021-09-29 PROCEDURE — 84484 ASSAY OF TROPONIN QUANT: CPT

## 2021-09-29 PROCEDURE — 71045 X-RAY EXAM CHEST 1 VIEW: CPT

## 2021-09-29 PROCEDURE — A9270 NON-COVERED ITEM OR SERVICE: HCPCS | Performed by: EMERGENCY MEDICINE

## 2021-09-29 PROCEDURE — 71275 CT ANGIOGRAPHY CHEST: CPT

## 2021-09-29 PROCEDURE — 93970 EXTREMITY STUDY: CPT

## 2021-09-29 PROCEDURE — 36415 COLL VENOUS BLD VENIPUNCTURE: CPT

## 2021-09-29 PROCEDURE — 85025 COMPLETE CBC W/AUTO DIFF WBC: CPT

## 2021-09-29 PROCEDURE — 99285 EMERGENCY DEPT VISIT HI MDM: CPT

## 2021-09-29 PROCEDURE — 700111 HCHG RX REV CODE 636 W/ 250 OVERRIDE (IP): Performed by: EMERGENCY MEDICINE

## 2021-09-29 PROCEDURE — 85610 PROTHROMBIN TIME: CPT

## 2021-09-29 PROCEDURE — 80053 COMPREHEN METABOLIC PANEL: CPT

## 2021-09-29 PROCEDURE — 700102 HCHG RX REV CODE 250 W/ 637 OVERRIDE(OP): Performed by: EMERGENCY MEDICINE

## 2021-09-29 PROCEDURE — 93005 ELECTROCARDIOGRAM TRACING: CPT | Performed by: EMERGENCY MEDICINE

## 2021-09-29 RX ORDER — KETOROLAC TROMETHAMINE 30 MG/ML
30 INJECTION, SOLUTION INTRAMUSCULAR; INTRAVENOUS ONCE
Status: COMPLETED | OUTPATIENT
Start: 2021-09-29 | End: 2021-09-29

## 2021-09-29 RX ADMIN — RIVAROXABAN 15 MG: 15 TABLET, FILM COATED ORAL at 22:07

## 2021-09-29 RX ADMIN — KETOROLAC TROMETHAMINE 30 MG: 30 INJECTION, SOLUTION INTRAMUSCULAR at 17:51

## 2021-09-29 RX ADMIN — IOHEXOL 75 ML: 350 INJECTION, SOLUTION INTRAVENOUS at 18:53

## 2021-09-29 ASSESSMENT — FIBROSIS 4 INDEX: FIB4 SCORE: 0.49

## 2021-09-29 NOTE — ED TRIAGE NOTES
"Chief Complaint   Patient presents with   • Chest Pain     L side chest pain that radiates to L side shoulder, arm and ribs, c/o SOB, high BP     BP (!) 164/112   Pulse (!) 118   Temp 36.7 °C (98.1 °F) (Temporal)   Resp 20   Ht 1.778 m (5' 10\")   Wt (!) 131 kg (288 lb 12.8 oz)   SpO2 100%   BMI 41.44 kg/m²     Pt arrived w/ above concern, pt broke her R side ankle 10 weeks ago and is concerned for a PE    Has this patient been vaccinated for COVID - YEs  If not, would they like to be vaccinated while in the ER if eligible?  n/a  Would the patient like to speak with the ERP about the possibility of receiving the COVID vaccine today before making a decision? n/a    "

## 2021-09-30 ENCOUNTER — DOCUMENTATION (OUTPATIENT)
Dept: VASCULAR LAB | Facility: MEDICAL CENTER | Age: 36
End: 2021-09-30

## 2021-09-30 NOTE — DISCHARGE INSTRUCTIONS
Turn to the emergency part of you have severe pain, nausea, vomiting, shortness of breath, lightness, dizziness.  I have started you on Xarelto, anticoagulation you will need to follow-up with the anticoagulation clinic.  Have given you a formal referral for this.

## 2021-09-30 NOTE — ED NOTES
Report received. Pt resting comfortably in [facility], no needs at this time

## 2021-09-30 NOTE — ED NOTES
Pt discharged in stable condition. Pt was prescribed xarelto by the physician. Pt instructed to schedule appointment with anticoag clinic, follow up with PCP, return to the ER if symptoms worsen. PIV removed, tip intact.

## 2021-09-30 NOTE — ED PROVIDER NOTES
ED Provider Note    CHIEF COMPLAINT  Chief Complaint   Patient presents with   • Chest Pain     L side chest pain that radiates to L side shoulder, arm and ribs, c/o SOB, high BP       HPI  Kathi Guan is a 35 y.o. female who presents to the emergency department with complaint of left-sided chest pain.  She states that she woke up in the morning 3 days ago with severe left-sided sharp stabbing chest pain that radiated to her shoulder, neck and down her arm.  It pain increases with inspiration and movement decreases the rest.  Pain is constant in nature yet does decrease when she is not breathing heavily.  She broke her ankle several weeks ago and has been in a walking boot ever since with Workmen's Compensation yet the patient has yet to follow-up with an orthopedist for this.  Patient denies swelling of her lower extremities, fever, shakes, chills, sweats, nausea, vomiting.  She is Covid vaccinated with a [employer].  He did endorse that she was at her office earlier today and her blood pressure was 160/116 and her saturation oxygen were normal.  She states she normally has a normal blood pressure.    Cardiac Risk Factors:  No Age > 65  No Aspirin use within 7 days  No prior history of coronary artery disease  No diabetes  No hyperlipidemia   No hypertension  No obesity  No family history of coronary artery disease at a young age <54 yo  No tobacco use   No drugs (methamphetamine or cocaine)  No history of aortic aneurysm   No history of aortic dissection   No history of deep vein thrombosis or pulmonary embolism   No hormone replacement  No oral birth control      REVIEW OF SYSTEMS  Positives as above. Pertinent negatives include hemoptysis, fever, shakes, chills, sweats, cough  All other 10 review of systems are negative    PAST MEDICAL HISTORY  Past Medical History:   Diagnosis Date   • Asthma        FAMILY HISTORY  Noncontributory    SOCIAL HISTORY  Social History     Socioeconomic History   •  Marital status:      Spouse name: Not on file   • Number of children: Not on file   • Years of education: Not on file   • Highest education level: Not on file   Occupational History   • Not on file   Tobacco Use   • Smoking status: Never Smoker   • Smokeless tobacco: Never Used   Vaping Use   • Vaping Use: Never used   Substance and Sexual Activity   • Alcohol use: Yes     Comment: 3-5 drinks per week, a little of everything   • Drug use: Yes     Types: Marijuana, Inhaled     Comment: occasionally   • Sexual activity: Yes     Partners: Male     Birth control/protection: Male Sterilization   Other Topics Concern   • Not on file   Social History Narrative   • Not on file     Social Determinants of Health     Financial Resource Strain:    • Difficulty of Paying Living Expenses:    Food Insecurity:    • Worried About Running Out of Food in the Last Year:    • Ran Out of Food in the Last Year:    Transportation Needs:    • Lack of Transportation (Medical):    • Lack of Transportation (Non-Medical):    Physical Activity:    • Days of Exercise per Week:    • Minutes of Exercise per Session:    Stress:    • Feeling of Stress :    Social Connections:    • Frequency of Communication with Friends and Family:    • Frequency of Social Gatherings with Friends and Family:    • Attends Rastafari Services:    • Active Member of Clubs or Organizations:    • Attends Club or Organization Meetings:    • Marital Status:    Intimate Partner Violence:    • Fear of Current or Ex-Partner:    • Emotionally Abused:    • Physically Abused:    • Sexually Abused:        SURGICAL HISTORY  History reviewed. No pertinent surgical history.    CURRENT MEDICATIONS  Home Medications     Reviewed by Layla Gonzalez R.N. (Registered Nurse) on 09/29/21 at 1544  Med List Status: Not Addressed   Medication Last Dose Status   albuterol 108 (90 Base) MCG/ACT Aero Soln inhalation aerosol  Active   Cetirizine HCl (ZYRTEC ALLERGY PO)  Active           "      ALLERGIES  Allergies   Allergen Reactions   • Penicillins Hives       PHYSICAL EXAM  VITAL SIGNS: /79   Pulse 85   Temp 36.4 °C (97.6 °F) (Temporal)   Resp 20   Ht 1.778 m (5' 10\")   Wt (!) 131 kg (288 lb 12.8 oz)   SpO2 98%   BMI 41.44 kg/m²      Constitutional: Well developed, Well nourished, slight acute distress, Non-toxic appearance.   Eyes: PERRLA, EOMI, Conjunctiva normal, No discharge.   Cardiovascular: Tachycardic, Normal rhythm, No murmurs, No rubs, No gallops, and intact distal pulses.   Thorax & Lungs:  No respiratory distress, no rales, no rhonchi, No wheezing, No chest wall tenderness.   Abdomen: Bowel sounds normal, Soft, No tenderness, No guarding, No rebound, No pulsatile masses.   Skin: Warm, Dry, No erythema, No rash.   Extremities: Full range of motion, no deformity, no edema.  Neurologic: Alert & oriented x 3, No focal deficits noted, acting appropriately on exam.  Psychiatric: Affect normal for clinical presentation.      LABORATORY/ECG  Results for orders placed or performed during the hospital encounter of 09/29/21   CBC with Differential   Result Value Ref Range    WBC 8.3 4.8 - 10.8 K/uL    RBC 4.82 4.20 - 5.40 M/uL    Hemoglobin 13.8 12.0 - 16.0 g/dL    Hematocrit 42.6 37.0 - 47.0 %    MCV 88.4 81.4 - 97.8 fL    MCH 28.6 27.0 - 33.0 pg    MCHC 32.4 (L) 33.6 - 35.0 g/dL    RDW 47.1 35.9 - 50.0 fL    Platelet Count 301 164 - 446 K/uL    MPV 10.3 9.0 - 12.9 fL    Neutrophils-Polys 58.20 44.00 - 72.00 %    Lymphocytes 26.40 22.00 - 41.00 %    Monocytes 8.80 0.00 - 13.40 %    Eosinophils 5.90 0.00 - 6.90 %    Basophils 0.50 0.00 - 1.80 %    Immature Granulocytes 0.20 0.00 - 0.90 %    Nucleated RBC 0.00 /100 WBC    Neutrophils (Absolute) 4.80 2.00 - 7.15 K/uL    Lymphs (Absolute) 2.18 1.00 - 4.80 K/uL    Monos (Absolute) 0.73 0.00 - 0.85 K/uL    Eos (Absolute) 0.49 0.00 - 0.51 K/uL    Baso (Absolute) 0.04 0.00 - 0.12 K/uL    Immature Granulocytes (abs) 0.02 0.00 - 0.11 K/uL    " NRBC (Absolute) 0.00 K/uL   Complete Metabolic Panel (CMP)   Result Value Ref Range    Sodium 139 135 - 145 mmol/L    Potassium 4.3 3.6 - 5.5 mmol/L    Chloride 105 96 - 112 mmol/L    Co2 21 20 - 33 mmol/L    Anion Gap 13.0 7.0 - 16.0    Glucose 98 65 - 99 mg/dL    Bun 14 8 - 22 mg/dL    Creatinine 0.79 0.50 - 1.40 mg/dL    Calcium 9.1 8.4 - 10.2 mg/dL    AST(SGOT) 21 12 - 45 U/L    ALT(SGPT) 22 2 - 50 U/L    Alkaline Phosphatase 76 30 - 99 U/L    Total Bilirubin 0.8 0.1 - 1.5 mg/dL    Albumin 3.7 3.2 - 4.9 g/dL    Total Protein 6.9 6.0 - 8.2 g/dL    Globulin 3.2 1.9 - 3.5 g/dL    A-G Ratio 1.2 g/dL   Troponin   Result Value Ref Range    Troponin T <6 6 - 19 ng/L   ESTIMATED GFR   Result Value Ref Range    GFR If African American >60 >60 mL/min/1.73 m 2    GFR If Non African American >60 >60 mL/min/1.73 m 2   BETA-HCG QUALITATIVE SERUM   Result Value Ref Range    Beta-Hcg Qualitative Serum Negative Negative   PT/INR   Result Value Ref Range    PT 14.0 12.0 - 14.6 sec    INR 1.17 (H) 0.87 - 1.13   PTT   Result Value Ref Range    APTT 30.0 24.7 - 36.0 sec   EKG   Result Value Ref Range    Report       [facility] Emergency Dept.    Test Date:  2021  Pt Name:    DAYSI LEMA              Department: [facility]  MRN:        [id number]                      Room:  Gender:     Female                       Technician: ALISIA  :        1985                   Requested By:ER TRIAGE PROTOCOL  Order #:    [id number]                    Reading MD: EARLE TILLEY DO    Measurements  Intervals                                Axis  Rate:       84                           P:          46  NV:         152                          QRS:        73  QRSD:       93                           T:          50  QT:         372  QTc:        440    Interpretive Statements  Sinus rhythm  No previous ECG available for comparison  Electronically Signed On 2021 17:21:18 PDT by EARLE TILLEY DO            RADIOLOGY/PROCEDURES  US-EXTREMITY VENOUS LOWER BILAT   Final Result         1. Limited evaluation due to body habitus. No definite evidence of bilateral lower extremity deep venous thrombosis.      CT-CTA CHEST PULMONARY ARTERY W/ RECONS   Final Result      1.  There is a subsegmental bilateral lower lobe pulmonary embolism.   2.  Small left pleural effusion and subsegmental left basilar atelectasis.   3.  There is a 2 mm pulmonary nodule in the right upper lobe. In patients with a low risk for lung cancer and without a known malignancy, guidelines by the Fleischner society (Radiology 2005; 237:395-400) suggest that nodules less than or equal to 4 mm    in diameter do not require any further follow-up. In patients with a higher risk, such as those who are smokers, a follow-up CT is recommended in one year. In patients with a known malignancy, small nodules less than or equal to 5 mm have a low but    measurable risk of representing metastases and should receive follow-up as dictated by the biology of the primary tumor.                           Findings communicated with Dr. Kaplan by Dr. Yarbrough at 7:03 PM 9/29/2021      DX-CHEST-PORTABLE (1 VIEW)   Final Result      Mild cardiomegaly.            COURSE & MEDICAL DECISION MAKING  Pertinent Labs & Imaging studies reviewed. (See chart for details)  This is a pleasant 35-year-old female who presents with chest pain.  She has a heart score of 0.  She does have a history of right foot fracture and I was concerned for possible pulmonary embolism secondary to her tachycardia, pleuritic chest pain, foot fracture being in a walking boot for several weeks.  CT pulmonary BHATIA was completed which showed bilateral subsegmental pulmonary emboli, slight atelectasis in the left and slight pleural effusion.  She did have low risk pulmonary nodules as well.  The patient had a RV to LV ratio 0.8, she has no hemic instability, no evidence of hypotension, profound tachycardia  on reevaluation.  She is hypoxic, speaking full sentences in no distress.  She received Toradol initially for her discomfort.  Following that, I had a long conversation with the patient concerning the need for anticoagulation.  She had a PES I of 35 or age only.  The patient is a very good candidate for outpatient management.  She understands the need for anticoagulation and Xarelto has been given here prescription for the same.  I did consent her for Xarelto use including the risks of bleeding and allergic reaction as well as the benefits including anticoagulation for future DVT/PEs.  She agreed for the medication.  The patient be following up with the anticoagulant clinic as well as her primary care physician.  Prior to discharge, her vital signs are stable, she has no evidence endorgan damage, she has no other hypoxia, tachypnea, tach cardia distress.    Please note bilateral DVT studies were completed but secondary body habitus is very difficult to ascertain.  Is very possible she had DVT in the right lower extremity that did migrate to the pulmonary vasculature is resulting in her pulmonary midline.      FINAL IMPRESSION     1. Multiple subsegmental pulmonary emboli without acute cor pulmonale (HCC) Active   2. Pleuritic chest pain Active         DISPOSITION:  Patient will be discharged home in stable condition.    FOLLOW UP:  [facility], Emergency Dept  [id number]  [facility] [id number] [phone]    If symptoms worsen    May Sierra, CAYDEN.P.R.N.  [address]  [place] [id number] [phone]    Schedule an appointment as soon as possible for a visit         OUTPATIENT MEDICATIONS:  Discharge Medication List as of 9/29/2021 10:02 PM      START taking these medications    Details   rivaroxaban (XARELTO) 15 MG Tab tablet Take 1 Tablet by mouth 2 times a day for 21 days., Disp-42 Tablet, R-0, Print Rx Paper                  Electronically signed by: Charles HERNANDEZ  LAURA Kaplan, 9/29/2021 5:21 PM

## 2021-10-01 ENCOUNTER — SUPERVISING PHYSICIAN REVIEW (OUTPATIENT)
Dept: VASCULAR LAB | Facility: MEDICAL CENTER | Age: 36
End: 2021-10-01

## 2021-10-01 ENCOUNTER — ANTICOAGULATION VISIT (OUTPATIENT)
Dept: MEDICAL GROUP | Facility: PHYSICIAN GROUP | Age: 36
End: 2021-10-01
Payer: COMMERCIAL

## 2021-10-01 ENCOUNTER — OFFICE VISIT (OUTPATIENT)
Dept: MEDICAL GROUP | Facility: PHYSICIAN GROUP | Age: 36
End: 2021-10-01
Payer: COMMERCIAL

## 2021-10-01 VITALS
TEMPERATURE: 98.2 F | HEART RATE: 81 BPM | OXYGEN SATURATION: 99 % | SYSTOLIC BLOOD PRESSURE: 118 MMHG | RESPIRATION RATE: 18 BRPM | DIASTOLIC BLOOD PRESSURE: 78 MMHG | WEIGHT: 293 LBS | HEIGHT: 69 IN | BODY MASS INDEX: 43.4 KG/M2

## 2021-10-01 DIAGNOSIS — I26.94 MULTIPLE SUBSEGMENTAL PULMONARY EMBOLI WITHOUT ACUTE COR PULMONALE (HCC): ICD-10-CM

## 2021-10-01 DIAGNOSIS — Z09 HOSPITAL DISCHARGE FOLLOW-UP: ICD-10-CM

## 2021-10-01 DIAGNOSIS — I26.99 OTHER ACUTE PULMONARY EMBOLISM WITHOUT ACUTE COR PULMONALE (HCC): ICD-10-CM

## 2021-10-01 DIAGNOSIS — G47.9 DIFFICULTY SLEEPING: ICD-10-CM

## 2021-10-01 DIAGNOSIS — S82.891A FX ANKLE, RIGHT, CLOSED, INITIAL ENCOUNTER: ICD-10-CM

## 2021-10-01 PROCEDURE — 99214 OFFICE O/P EST MOD 30 MIN: CPT | Performed by: NURSE PRACTITIONER

## 2021-10-01 PROCEDURE — 99211 OFF/OP EST MAY X REQ PHY/QHP: CPT | Performed by: INTERNAL MEDICINE

## 2021-10-01 RX ORDER — TRAMADOL HYDROCHLORIDE 50 MG/1
50 TABLET ORAL EVERY 6 HOURS PRN
Qty: 28 TABLET | Refills: 0 | Status: SHIPPED | OUTPATIENT
Start: 2021-10-01 | End: 2021-10-08

## 2021-10-01 RX ORDER — IBUPROFEN 800 MG/1
800 TABLET ORAL 2 TIMES DAILY PRN
COMMUNITY
Start: 2021-08-23 | End: 2021-10-01

## 2021-10-01 RX ORDER — AZITHROMYCIN 250 MG/1
TABLET, FILM COATED ORAL
COMMUNITY
Start: 2021-07-30 | End: 2021-10-18

## 2021-10-01 RX ORDER — BENZOCAINE AND MENTHOL, UNSPECIFIED FORM 15; 20 MG/1; MG/1
LOZENGE ORAL
COMMUNITY
Start: 2021-07-30 | End: 2021-10-18

## 2021-10-01 ASSESSMENT — FIBROSIS 4 INDEX: FIB4 SCORE: 0.52

## 2021-10-01 NOTE — PROGRESS NOTES
Initial anticoag note and most recent ED note reviewed.   Patient started on anticoag with xarelto for bilateral multiple subsegmental PE presumably provoked by orthopedic injury and immobilization.   U/s of lower extremity essentially non-diagnostic due to body habitus.     Will continue with 3 mo of oral anticoag and then check back with pcp to see if it can be safely discontinued    Michael Bloch, MD  Anticoagulation Clinic    Cc:  VIOLA Sierra

## 2021-10-01 NOTE — ASSESSMENT & PLAN NOTE
Chronic medical problem. She was seen in ER 9/29/2021 for chest pain and her CT of her chest was positive for bilateral subsegmental pulmonary embolism.  She is taking Xarelto 50 mg twice daily for the next 21 days and will transition to Xarelto 20 mg with her evening meal after.  She has stopped her anti-inflammatories.  She did have a follow-up appointment this morning with the anticoagulation clinic and she has upcoming appointment scheduled for 3 weeks.

## 2021-10-01 NOTE — ASSESSMENT & PLAN NOTE
Acute medical problem.  She was seen in the emergency room on 9/29/2021 for chest pain that started 3 days before and pain with deep breathing.  She did recently fracture her right ankle while at work and is currently going through Worker's Comp.  Her chest x-ray was negative for any cardiopulmonary processes.  Her CT of her chest did show bilateral subsegmental pulmonary embolism, left atelectasis, left pleural effusion and 2 mm right upper lobe nodule.  Her ultrasound of her legs were negative for DVT.  She was started on Xarelto 15 mg twice daily.  She was scheduled for follow-up with anticoagulation clinic which she did complete this morning.  She is completing 21 days of Xarelto 15 mg twice daily and then will transition to 20 mg of Xarelto with evening meal.  Her pulse rate is 81 today and oxygen levels 99% today.

## 2021-10-01 NOTE — PROGRESS NOTES
[facility]    Received anticoag referral for Xarelto from Dr. Kaplan on 9/29/21    Pt scheduled for initial visit on 10/1 @ 7:30 am    Insurance: [facility]  [facility]P: [facility]  Locations to be seen: [facility] at [phone], fax [phone]    Jas Brambila, NickolasD

## 2021-10-01 NOTE — ASSESSMENT & PLAN NOTE
Chronic medical problem.  10 weeks ago she had a right ankle fracture.  She is having difficult time sleeping. She does have pain of her right ankle that has been keeping her awake and now recently with her new diagnosis of pulmonary embolisms she has been having some chest pain.

## 2021-10-01 NOTE — PROGRESS NOTES
NEW DOAC   .  Anticoagulation Patient Findings      PCP: JET Jade  Cardiologist: N/a  Dx: Multiple subsegmental pulmonary emboli  CHADSVASC = N/a  HAS-BLED = 0  Target End Date = 1/1/2022 (3 months)    Pt Hx: Pt presented to ED on 9/29/21 d/t left-sided CP that radiated to her should, neck, and down her arm. Pt associated worsening pain w/ inspiration. Of note, she broke her ankle several weeks ago and has been walking in a boot ever since. Upon further workup in the ED, pt was found to have bilateral subsegmental PE and subsequently started on Xarelto. Bilateral DVT studies were completed in the ED, but d/t body habitus, but were difficult to ascertain - ED physician believed pt may have had DVT in her RLE that migrated to the pulmonary vasculature. No prior hx of VTE and no FH.    Labs:  Lab Results   Component Value Date/Time    WBC 8.3 09/29/2021 04:02 PM    RBC 4.82 09/29/2021 04:02 PM    HEMOGLOBIN 13.8 09/29/2021 04:02 PM    HEMATOCRIT 42.6 09/29/2021 04:02 PM    MCV 88.4 09/29/2021 04:02 PM    MCH 28.6 09/29/2021 04:02 PM    MCHC 32.4 (L) 09/29/2021 04:02 PM    MPV 10.3 09/29/2021 04:02 PM    NEUTSPOLYS 58.20 09/29/2021 04:02 PM    LYMPHOCYTES 26.40 09/29/2021 04:02 PM    MONOCYTES 8.80 09/29/2021 04:02 PM    EOSINOPHILS 5.90 09/29/2021 04:02 PM    BASOPHILS 0.50 09/29/2021 04:02 PM      Lab Results   Component Value Date/Time    SODIUM 139 09/29/2021 04:02 PM    POTASSIUM 4.3 09/29/2021 04:02 PM    CHLORIDE 105 09/29/2021 04:02 PM    CO2 21 09/29/2021 04:02 PM    GLUCOSE 98 09/29/2021 04:02 PM    BUN 14 09/29/2021 04:02 PM    CREATININE 0.79 09/29/2021 04:02 PM          Pt is new to Xarelto and new to [facility]. Discussed:   · Indication for DOAC therapy.  · Importance of monitoring and compliance.   · Monitoring parameters, signs and symptoms of bleeding or clotting.  · DOAC therapy, side effects, potential DDIs, OTC medications  · Hormonal therapy - N/a  · Pregnancy - N/a. Counseled.  · DDI  - No significant DDI noted  · Pt is not on antiplatelet/NSAID therapy.   · Lifestyle safety, ie smoking, ETOH, hobby safety, fall safety/prevention  · Procedures for missed doses or suspected missed doses, surgeries/procedures, travel, dental work, any medication changes    Start with Xarelto 15mg taken 2 times a day for 21 days (started 9/29/21) and then change to 20mg taken once daily. Pt will transition to 20mg dose on 10/21/21    DOAC affordable = Yes - pt pursuing workers comp coverage    Samples provided: No    Labs to be completed prior to next f/u - CBC, CMP    F/U - 3 weeks    Jas Brambila, PharmD      Added [facility] to care team   CC: Dr. Bloch

## 2021-10-01 NOTE — ASSESSMENT & PLAN NOTE
Chronic medical problem.  10 weeks ago she fell at work.  She states that she has been having a difficult time with her Worker's Comp.  She had an MRI at [facility] but has not heard the results and has been unable to have a follow-up provider.  Her work told to rest and not use her crutches for more than an hour and a day.  She has been sedentary.  She was recently diagnosed with bilateral subsegmental pulmonary embolism.

## 2021-10-01 NOTE — PROGRESS NOTES
Subjective:     CC: hospital follow-up       HPI:   Kathi presents today with her  Daren:    Multiple subsegmental pulmonary emboli without acute cor pulmonale (HCC)  Chronic medical problem. She was seen in ER 9/29/2021 for chest pain and her CT of her chest was positive for bilateral subsegmental pulmonary embolism.  She is taking Xarelto 50 mg twice daily for the next 21 days and will transition to Xarelto 20 mg with her evening meal after.  She has stopped her anti-inflammatories.  She did have a follow-up appointment this morning with the anticoagulation clinic and she has upcoming appointment scheduled for 3 weeks.    Hospital discharge follow-up  Acute medical problem.  She was seen in the emergency room on 9/29/2021 for chest pain that started 3 days before and pain with deep breathing.  She did recently fracture her right ankle while at work and is currently going through Worker's Comp.  Her chest x-ray was negative for any cardiopulmonary processes.  Her CT of her chest did show bilateral subsegmental pulmonary embolism, left atelectasis, left pleural effusion and 2 mm right upper lobe nodule.  Her ultrasound of her legs were negative for DVT.  She was started on Xarelto 15 mg twice daily.  She was scheduled for follow-up with anticoagulation clinic which she did complete this morning.  She is completing 21 days of Xarelto 15 mg twice daily and then will transition to 20 mg of Xarelto with evening meal.  Her pulse rate is 81 today and oxygen levels 99% today.    Fx ankle, right, closed, initial encounter  Chronic medical problem.  10 weeks ago she fell at work.  She states that she has been having a difficult time with her Worker's Comp.  She had an MRI at [facility] but has not heard the results and has been unable to have a follow-up provider.  Her work told to rest and not use her crutches for more than an hour and a day.  She has been sedentary.  She was recently diagnosed with bilateral  subsegmental pulmonary embolism.    Difficulty sleeping  Chronic medical problem.  10 weeks ago she had a right ankle fracture.  She is having difficult time sleeping. She does have pain of her right ankle that has been keeping her awake and now recently with her new diagnosis of pulmonary embolisms she has been having some chest pain.      Past Medical History:   Diagnosis Date   • Asthma        Social History     Tobacco Use   • Smoking status: Never Smoker   • Smokeless tobacco: Never Used   Vaping Use   • Vaping Use: Never used   Substance Use Topics   • Alcohol use: Yes     Comment: 3-5 drinks per week, a little of everything   • Drug use: Yes     Types: Marijuana, Inhaled     Comment: occasionally       Current Outpatient Medications Ordered in Epic   Medication Sig Dispense Refill   • MILK THISTLE PO Take  by mouth.     • azithromycin (ZITHROMAX) 250 MG Tab TAKE 2 TABLETS BY MOUTH TODAY, THEN TAKE 1 TABLET DAILY FOR 4 DAYS     • traMADol (ULTRAM) 50 MG Tab Take 1 Tablet by mouth every 6 hours as needed for Severe Pain for up to 7 days. 28 Tablet 0   • rivaroxaban (XARELTO) 15 MG Tab tablet Take 1 Tablet by mouth 2 times a day for 21 days. 42 Tablet 0   • albuterol 108 (90 Base) MCG/ACT Aero Soln inhalation aerosol Inhale 2 Puffs every 6 hours as needed. 8.5 g 2   • Cetirizine HCl (ZYRTEC ALLERGY PO) Take  by mouth.     • rivaroxaban (XARELTO) 20 MG Tab tablet Take 1 Tablet by mouth with dinner. (Patient not taking: Reported on 10/1/2021) 30 Tablet 3   • CEPACOL INSTAMAX 15-20 MG Lozenge TAKE 1 JAMAAL 4 TIMES A DAY, AS NEEDED       No current Epic-ordered facility-administered medications on file.       Allergies:  Penicillins    Health Maintenance: Reviewed    ROS:  Gen: no fevers/chills, + difficulty sleeping  Pulm: no shortness of breath  CV: + Intermittent chest pain  GI: no nausea/vomiting, no diarrhea  MSk: + Right ankle pain  Skin: no rash  Neuro: no headaches, no numbness/tingling    Objective:     Vital  "signs reviewed  Exam:  /78 (BP Location: Left arm, Patient Position: Sitting, BP Cuff Size: Adult)   Pulse 81   Temp 36.8 °C (98.2 °F) (Temporal)   Resp 18   Ht 1.753 m (5' 9\")   Wt (!) 134 kg (296 lb)   SpO2 99%   BMI 43.71 kg/m²  Body mass index is 43.71 kg/m².    Gen: Alert and oriented, No apparent distress.   present in exam room.  Tearful when discussing recent diagnosis.  Lungs: Normal effort, CTA bilaterally, no wheezes, rhonchi, or rales  CV: Regular rate and rhythm. No murmurs, rubs, or gallops.  Ext: No clubbing, cyanosis, edema.  Using crutches with ambulation.  Right ankle in boot.      Assessment & Plan:     35 y.o. female with the following -     1. Hospital discharge follow-up  Acute uncomplicated problem.  Hospital discharge follow-up completed today.  I reviewed her recent ER notes, imaging and labs.  I did review anticoagulation clinic's note today.    2. Other acute pulmonary embolism without acute cor pulmonale (HCC)  Chronic stable problem.  She will continue with her Xarelto 15 mg twice daily for the next 21 days and then transition to Xarelto 20 mg with evening meal.  We discussed importance of stopping anti-inflammatories and aspirin.  She is having pain and difficulty sleeping due to her pain.  She states that Norco that she was previously given gave her a headache.  We will try tramadol.  We discussed that she may take half a tablet to see how she reacts, discussed to not operate machinery or drive.  She verbalized understanding.  PDMP reviewed today.  Opioid screening tool completed today.  Controlled substance treatment agreement completed today.  She will return in 1 month for follow-up.  Red flags discussed.  - traMADol (ULTRAM) 50 MG Tab; Take 1 Tablet by mouth every 6 hours as needed for Severe Pain for up to 7 days.  Dispense: 28 Tablet; Refill: 0  - Controlled Substance Treatment Agreement    3. Fx ankle, right, closed, initial encounter  Chronic exacerbated " problem.  Encouraged her to follow-up with her employer and inform that she was recently in hospital and diagnosed with pulmonary embolisms.  She verbalized understanding.  For her continued right ankle pain we will try low-dose tramadol see if this helps improve.  She is unable to take anti-inflammatories that was previously helping due to her recent diagnosis and being on Xarelto.  - traMADol (ULTRAM) 50 MG Tab; Take 1 Tablet by mouth every 6 hours as needed for Severe Pain for up to 7 days.  Dispense: 28 Tablet; Refill: 0  - Controlled Substance Treatment Agreement    4. Difficulty sleeping  Chronic stable problem.  We discussed trying over-the-counter melatonin.  Medication interactions checked with up-to-date that did not show any reactions with melatonin and her Xarelto.  Patient has been verbalized understanding.        Return in about 4 weeks (around 10/29/2021) for pulmonary embolism, ankle fracture.    Please note that this dictation was created using voice recognition software. I have made every reasonable attempt to correct obvious errors, but I expect that there are errors of grammar and possibly content that I did not discover before finalizing the note.

## 2021-10-11 ENCOUNTER — PATIENT MESSAGE (OUTPATIENT)
Dept: MEDICAL GROUP | Facility: PHYSICIAN GROUP | Age: 36
End: 2021-10-11

## 2021-10-11 ENCOUNTER — DOCUMENTATION (OUTPATIENT)
Dept: VASCULAR LAB | Facility: MEDICAL CENTER | Age: 36
End: 2021-10-11

## 2021-10-11 DIAGNOSIS — R55 SYNCOPE, UNSPECIFIED SYNCOPE TYPE: ICD-10-CM

## 2021-10-11 NOTE — PROGRESS NOTES
Received a call from Kathi reporting an episode of syncope and questioning in Xarelto was the cause. She has a history of syncope when hypoglycemic. Her  was able to catch her and she was unharmed. Told Kathi that starting Xarelto was most likely unrelated and recommended she follow-up with her PCP.     Rashi Smith, NickolasD

## 2021-10-12 NOTE — PROGRESS NOTES
Patient reached out to anticoagulation clinic for syncopal episode and has questions if it is related to her Xarelto. symptoms not consistent with Xarelto side effects. Will check lab, differential diagnosis includes anemia, electrolyte abnormality, diabetes, urinary tract infection.

## 2021-10-13 ENCOUNTER — HOSPITAL ENCOUNTER (OUTPATIENT)
Dept: LAB | Facility: MEDICAL CENTER | Age: 36
End: 2021-10-13
Attending: NURSE PRACTITIONER
Payer: COMMERCIAL

## 2021-10-13 DIAGNOSIS — I26.94 MULTIPLE SUBSEGMENTAL PULMONARY EMBOLI WITHOUT ACUTE COR PULMONALE (HCC): ICD-10-CM

## 2021-10-13 DIAGNOSIS — R55 SYNCOPE, UNSPECIFIED SYNCOPE TYPE: ICD-10-CM

## 2021-10-13 LAB
ALBUMIN SERPL BCP-MCNC: 4.3 G/DL (ref 3.2–4.9)
ALBUMIN/GLOB SERPL: 1.6 G/DL
ALP SERPL-CCNC: 61 U/L (ref 30–99)
ALT SERPL-CCNC: 18 U/L (ref 2–50)
ANION GAP SERPL CALC-SCNC: 10 MMOL/L (ref 7–16)
APPEARANCE UR: CLEAR
AST SERPL-CCNC: 17 U/L (ref 12–45)
BILIRUB SERPL-MCNC: 0.4 MG/DL (ref 0.1–1.5)
BILIRUB UR QL STRIP.AUTO: NEGATIVE
BUN SERPL-MCNC: 15 MG/DL (ref 8–22)
CALCIUM SERPL-MCNC: 9.5 MG/DL (ref 8.5–10.5)
CHLORIDE SERPL-SCNC: 105 MMOL/L (ref 96–112)
CO2 SERPL-SCNC: 23 MMOL/L (ref 20–33)
COLOR UR: YELLOW
CREAT SERPL-MCNC: 0.79 MG/DL (ref 0.5–1.4)
ERYTHROCYTE [DISTWIDTH] IN BLOOD BY AUTOMATED COUNT: 49.1 FL (ref 35.9–50)
EST. AVERAGE GLUCOSE BLD GHB EST-MCNC: 97 MG/DL
FASTING STATUS PATIENT QL REPORTED: NORMAL
GLOBULIN SER CALC-MCNC: 2.7 G/DL (ref 1.9–3.5)
GLUCOSE SERPL-MCNC: 81 MG/DL (ref 65–99)
GLUCOSE UR STRIP.AUTO-MCNC: NEGATIVE MG/DL
HBA1C MFR BLD: 5 % (ref 4–5.6)
HCT VFR BLD AUTO: 43.3 % (ref 37–47)
HGB BLD-MCNC: 13.7 G/DL (ref 12–16)
KETONES UR STRIP.AUTO-MCNC: NEGATIVE MG/DL
LEUKOCYTE ESTERASE UR QL STRIP.AUTO: NEGATIVE
MCH RBC QN AUTO: 28.5 PG (ref 27–33)
MCHC RBC AUTO-ENTMCNC: 31.6 G/DL (ref 33.6–35)
MCV RBC AUTO: 90 FL (ref 81.4–97.8)
MICRO URNS: NORMAL
NITRITE UR QL STRIP.AUTO: NEGATIVE
PH UR STRIP.AUTO: 6 [PH] (ref 5–8)
PLATELET # BLD AUTO: 295 K/UL (ref 164–446)
PMV BLD AUTO: 11.1 FL (ref 9–12.9)
POTASSIUM SERPL-SCNC: 4.4 MMOL/L (ref 3.6–5.5)
PROT SERPL-MCNC: 7 G/DL (ref 6–8.2)
PROT UR QL STRIP: NEGATIVE MG/DL
RBC # BLD AUTO: 4.81 M/UL (ref 4.2–5.4)
RBC UR QL AUTO: NEGATIVE
SODIUM SERPL-SCNC: 138 MMOL/L (ref 135–145)
SP GR UR STRIP.AUTO: 1.01
TSH SERPL DL<=0.005 MIU/L-ACNC: 2.72 UIU/ML (ref 0.38–5.33)
UROBILINOGEN UR STRIP.AUTO-MCNC: 0.2 MG/DL
WBC # BLD AUTO: 4.7 K/UL (ref 4.8–10.8)

## 2021-10-13 PROCEDURE — 36415 COLL VENOUS BLD VENIPUNCTURE: CPT

## 2021-10-13 PROCEDURE — 83036 HEMOGLOBIN GLYCOSYLATED A1C: CPT

## 2021-10-13 PROCEDURE — 85027 COMPLETE CBC AUTOMATED: CPT

## 2021-10-13 PROCEDURE — 84443 ASSAY THYROID STIM HORMONE: CPT

## 2021-10-13 PROCEDURE — 81003 URINALYSIS AUTO W/O SCOPE: CPT

## 2021-10-13 PROCEDURE — 80053 COMPREHEN METABOLIC PANEL: CPT

## 2021-10-18 ENCOUNTER — ANTICOAGULATION VISIT (OUTPATIENT)
Dept: MEDICAL GROUP | Facility: PHYSICIAN GROUP | Age: 36
End: 2021-10-18
Payer: COMMERCIAL

## 2021-10-18 DIAGNOSIS — I26.94 MULTIPLE SUBSEGMENTAL PULMONARY EMBOLI WITHOUT ACUTE COR PULMONALE (HCC): ICD-10-CM

## 2021-10-18 PROCEDURE — 99211 OFF/OP EST MAY X REQ PHY/QHP: CPT | Performed by: INTERNAL MEDICINE

## 2021-10-18 NOTE — PROGRESS NOTES
Target end date:1/1/2022     Indication: Multiple subsegmental pulmonary emboli     Drug: Xarelto      CHADsVASC = N/a    Health Status Since Last Assessment   Patient denies any new relevant medical problems, ED visits or hospitalizations   Patient denies any embolic events (stroke/tia/systemic embolism)    Adherence with DOAC Therapy   Pt has zero missed any doses in the average week    Bleeding Risk Assessment     Denies Epistaxis   Pt denies any excessive or unusual bleeding/hematomas.  Pt denies any GI bleeds or hematemesis.  Pt denies any concerning daily headache or sub dural hematoma symptoms.     Pt denies any hematuria    Latest Hemoglobin 13.7   ETOH overuse - has had one glass of wine since her last anticoag visit     Creatinine Clearance/Renal Function     Latest CrCl > 100 mL/min    Hepatic function   Latest LFTs WNL   Pt denies any history of liver dysfunction      Drug Interactions   Platelets: 295   ASA/other antiplatelets - Denies   NSAID - Denies   Other drug interactions - No significant DDI noted   x Verified no anticonvulsant or azole therapy, education provided for future use.     Examination   Blood Pressure WNL   Symptomatic hypotension - Yes. Pt states she's had a couple of syncopal episodes where she's aware she is having them and can take precautionary measures/get help from her . States this started when she initiated DOAC therapy.   Significant gait impairment/imbalance/high risk for falls? Pt w/ boot for her ankle injury and noted syncopal episodes. She does monitor her BP at home and it's WNL. Pt's CBC and CMP were WNL - denies abnormal s/sx of bleeding.    Final Assessment and Recommendations:   Patient appears stable from the anticoagulation standpoint.     Benefits of continued DOAC therapy outweigh risks for this patient   Recommend pt continue with current DOAC therapy, but transition to Eliquis once she completes Xarelto 15 mg BID x 21 days. Pt to transition over to  Eliquis 5 mg BID in an effort to minimize dizziness/potential SE. I am not confident that it is the medication that is causing her sx. Pt denies CP/tightness.   DOAC is affordable at this time - pursuing workers comp.     Other Actions: cmp/ cbc hemogram ordered prior to next visit    Follow up:   Will follow up with patient 3 months for LOT (sooner via telephone PRN if she continues to experience the sx described above w/ her transition to Eliquis).

## 2021-10-26 ENCOUNTER — OFFICE VISIT (OUTPATIENT)
Dept: MEDICAL GROUP | Facility: PHYSICIAN GROUP | Age: 36
End: 2021-10-26
Payer: COMMERCIAL

## 2021-10-26 VITALS
HEIGHT: 69 IN | OXYGEN SATURATION: 97 % | BODY MASS INDEX: 43.4 KG/M2 | WEIGHT: 293 LBS | DIASTOLIC BLOOD PRESSURE: 76 MMHG | SYSTOLIC BLOOD PRESSURE: 116 MMHG | HEART RATE: 82 BPM | TEMPERATURE: 96.8 F

## 2021-10-26 DIAGNOSIS — S82.891A FX ANKLE, RIGHT, CLOSED, INITIAL ENCOUNTER: ICD-10-CM

## 2021-10-26 DIAGNOSIS — I26.94 MULTIPLE SUBSEGMENTAL PULMONARY EMBOLI WITHOUT ACUTE COR PULMONALE (HCC): ICD-10-CM

## 2021-10-26 PROBLEM — Z09 HOSPITAL DISCHARGE FOLLOW-UP: Status: RESOLVED | Noted: 2021-10-01 | Resolved: 2021-10-26

## 2021-10-26 PROCEDURE — 99214 OFFICE O/P EST MOD 30 MIN: CPT | Performed by: NURSE PRACTITIONER

## 2021-10-26 ASSESSMENT — FIBROSIS 4 INDEX: FIB4 SCORE: 0.49

## 2021-10-26 NOTE — ASSESSMENT & PLAN NOTE
Chronic medical problem. She was stopped on her Xarelto. She is now taking Eliquis 5 mg BID. She continues follow-up with anticoagulation clinic and her next upcoming appointment is 12/27/2021.  She reports that after stopping her Xarelto within 24 hours her nausea, fogginess and dizziness has resolved.  She is not having any chest pain, shortness of breath, dizziness, blurry vision, fogginess or nausea today.  She has not noticed any epistaxis, bleeding gums, hematuria or bloody stools.

## 2021-10-26 NOTE — PROGRESS NOTES
Subjective:     CC: follow-up    HPI:   Kathi presents today with the following:     Fx ankle, right, closed, initial encounter  Chronic medical problem. For the last 5 days she has been off crutches. She continues with a boot. Physical therapy has been ordered. She has had a verbal order from her employer to see ALBIN from [employer]. She is on light duty at work.     Multiple subsegmental pulmonary emboli without acute cor pulmonale (HCC)  Chronic medical problem. She was stopped on her Xarelto. She is now taking Eliquis 5 mg BID. She continues follow-up with anticoagulation clinic and her next upcoming appointment is 12/27/2021.  She reports that after stopping her Xarelto within 24 hours her nausea, fogginess and dizziness has resolved.  She is not having any chest pain, shortness of breath, dizziness, blurry vision, fogginess or nausea today.  She has not noticed any epistaxis, bleeding gums, hematuria or bloody stools.      Past Medical History:   Diagnosis Date   • Asthma        Social History     Tobacco Use   • Smoking status: Never Smoker   • Smokeless tobacco: Never Used   Vaping Use   • Vaping Use: Never used   Substance Use Topics   • Alcohol use: Yes     Comment: 3-5 drinks per week, a little of everything   • Drug use: Yes     Types: Marijuana, Inhaled     Comment: occasionally       Current Outpatient Medications Ordered in Epic   Medication Sig Dispense Refill   • apixaban (ELIQUIS) 5mg Tab Take 1 Tablet by mouth 2 times a day. 60 Tablet 3   • MILK THISTLE PO Take  by mouth.     • albuterol 108 (90 Base) MCG/ACT Aero Soln inhalation aerosol Inhale 2 Puffs every 6 hours as needed. 8.5 g 2   • Cetirizine HCl (ZYRTEC ALLERGY PO) Take  by mouth.       No current Epic-ordered facility-administered medications on file.       Allergies:  Penicillins    Health Maintenance: Declines influenza vaccine     ROS:  Per HPI      Objective:     Vital signs reviewed   Exam:  /76 (BP Location: Right  "arm, Patient Position: Sitting, BP Cuff Size: Adult)   Pulse 82   Temp 36 °C (96.8 °F) (Temporal)   Ht 1.753 m (5' 9\")   Wt (!) 134 kg (295 lb) Comment: With boot  SpO2 97%   BMI 43.56 kg/m²  Body mass index is 43.56 kg/m².    Gen: Alert and oriented, No apparent distress.  Neck: Neck is supple without lymphadenopathy.  Lungs: Normal effort, CTA bilaterally, no wheezes, rhonchi, or rales  CV: Regular rate and rhythm. No murmurs, rubs, or gallops.  Ext: No clubbing, cyanosis, edema. Right foot boot in place.       Assessment & Plan:     36 y.o. female with the following -     1. Multiple subsegmental pulmonary emboli without acute cor pulmonale (HCC)  Chronic stable problem.  Encouraged to continue with her Eliquis.  Encouraged to continue with the anticoagulation clinic.  Red flags discussed.  She will return in 4-5 months.  Or sooner if symptoms worsen.    2. Fx ankle, right, closed, initial encounter  Chronic stable problem.  Encouraged to continue with physical therapy and following up with ALBIN per her Worker's Comp.      Return in about 5 months (around 3/26/2022) for follow-up ankle .    Please note that this dictation was created using voice recognition software. I have made every reasonable attempt to correct obvious errors, but I expect that there are errors of grammar and possibly content that I did not discover before finalizing the note.      "

## 2021-10-26 NOTE — ASSESSMENT & PLAN NOTE
Chronic medical problem. For the last 5 days she has been off crutches. She continues with a boot. Physical therapy has been ordered. She has had a verbal order from her employer to see ALBIN from [employer]. She is on light duty at work.

## 2021-11-29 PROBLEM — M25.371 ANKLE INSTABILITY, RIGHT: Status: ACTIVE | Noted: 2021-11-29

## 2021-12-02 ASSESSMENT — FIBROSIS 4 INDEX: FIB4 SCORE: 0.49

## 2021-12-03 ENCOUNTER — DOCUMENTATION (OUTPATIENT)
Dept: VASCULAR LAB | Facility: MEDICAL CENTER | Age: 36
End: 2021-12-03

## 2021-12-03 NOTE — PROGRESS NOTES
"[facility]    Received call from patient reporting she was seen at [facility] this week.   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  She be nonweightbearing for 10 days followed by weightbearing progression in a brace\"      Pt scheduled for length of therapy appointment for bilateral multiple subsegmental PE presumably provoked by orthopedic injury and immobilization this month. Rescheduled appointment sooner to 12/15/21 to discuss periprocedural management of DOAC and therapy disposition following procedure.    Pt asked to call clinic back when surgery is scheduled in case she needs to receive anticoagulation instructions prior to appointment.    Darryn Iraheta, PharmD, [facility] at [phone], fax [phone]      "

## 2021-12-15 ENCOUNTER — TELEPHONE (OUTPATIENT)
Dept: VASCULAR LAB | Facility: MEDICAL CENTER | Age: 36
End: 2021-12-15

## 2021-12-15 ENCOUNTER — ANTICOAGULATION VISIT (OUTPATIENT)
Dept: VASCULAR LAB | Facility: MEDICAL CENTER | Age: 36
End: 2021-12-15
Attending: INTERNAL MEDICINE
Payer: COMMERCIAL

## 2021-12-15 VITALS — HEART RATE: 81 BPM | DIASTOLIC BLOOD PRESSURE: 84 MMHG | SYSTOLIC BLOOD PRESSURE: 117 MMHG

## 2021-12-15 DIAGNOSIS — I26.94 MULTIPLE SUBSEGMENTAL PULMONARY EMBOLI WITHOUT ACUTE COR PULMONALE (HCC): ICD-10-CM

## 2021-12-15 PROCEDURE — 99212 OFFICE O/P EST SF 10 MIN: CPT

## 2021-12-15 NOTE — Clinical Note
Eugene Olvera,    Given this pt has upcoming invasive procedure and recent PE would recommend additional 3 months of OAC. I forwarded onto vascular team. Let me know if you think a shorter duration of therapy would be more appropriate.     Thanks!  Jas

## 2021-12-15 NOTE — PROGRESS NOTES
Target end date: 1/4/2022     Indication: Multiple subsegmental pulmonary emboli     Drug: Eliquis 5 mg BID     CHADsVASC = N/a    Health Status Since Last Assessment   Patient denies any new relevant medical problems, ED visits or hospitalizations   Patient denies any embolic events (stroke/tia/systemic embolism)    Adherence with DOAC Therapy   Pt has zero missed any doses in the average week. States a couple in the last two months.    Bleeding Risk Assessment     Denies Epistaxis   Pt denies any excessive or unusual bleeding/hematomas.  Pt denies any GI bleeds or hematemesis.  Pt denies any concerning daily headache or sub dural hematoma symptoms.     Pt denies any hematuria    Latest Hemoglobin 13.6   ETOH overuse - Rare. 1-2 drinks per week     Creatinine Clearance/Renal Function     Latest CrCl > 100 mL/min    Hepatic function   Latest LFTs WNL   Pt denies any history of liver dysfunction      Drug Interactions   Platelets: 283   ASA/other antiplatelets - Denies. Counseled   NSAID - Denies. Counseled   Other drug interactions - No significant DDI noted   x Verified no anticonvulsant or azole therapy, education provided for future use.     Examination   Blood Pressure Recorded in vitals   Symptomatic hypotension Denies - better since transitioning to Eliquis   Significant gait impairment/imbalance/high risk for falls? No obvious impairments. Pt w/ ankle brace from previous ortho injury.    Final Assessment and Recommendations:   Patient appears stable from the anticoagulation standpoint.     Benefits of continued DOAC therapy outweigh risks for this patient   Recommend pt continue with current DOAC therapy - Eliquis 5 mg BID   DOAC is affordable - still working to have [employer] comp cover, but affordable at this time w/out their assistance   Pt was originally supposed to stop OAC on 1/1/22, but she is now going to be having another ortho procedure. Given her hx of recent PE's attributed to ortho injury, would  recommend additional 3 months of therapy after this upcoming procedure. Pt counseled to call our clinic once procedure is scheduled for shanelle-procedural anticoag management.     Other Actions: cmp/ cbc hemogram ordered prior to next visit    Follow up:   Will follow up with patient 3  months.     Jas Brambila, PharmD, BCACP

## 2021-12-16 NOTE — TELEPHONE ENCOUNTER
I have contact the patient to offer the services of [facility].     A PA is currently not required for Eliquis. The patient's copay $80.00.    At this time the patient has declined [facility] is more convenient and closer to home.

## 2021-12-17 NOTE — PROGRESS NOTES
CATRINA Jade.  Jas Brambila, Yan Mark,     With her upcoming invasive procedure I think 3 months is appropriate.  If the vascular team is a different recommendation I am okay with that as well.     Thank you,  May     Will update target end date to 3 months post procedure once scheduled.    Jas Brambila, Yan

## 2021-12-20 ENCOUNTER — PATIENT MESSAGE (OUTPATIENT)
Dept: MEDICAL GROUP | Facility: PHYSICIAN GROUP | Age: 36
End: 2021-12-20

## 2021-12-20 DIAGNOSIS — F41.9 ANXIETY: ICD-10-CM

## 2021-12-20 RX ORDER — HYDROXYZINE HYDROCHLORIDE 25 MG/1
25 TABLET, FILM COATED ORAL 3 TIMES DAILY PRN
Qty: 30 TABLET | Refills: 1 | Status: SHIPPED | OUTPATIENT
Start: 2021-12-20 | End: 2022-01-19

## 2021-12-21 NOTE — PROGRESS NOTES
Requested Prescriptions     Signed Prescriptions Disp Refills   • hydrOXYzine HCl (ATARAX) 25 MG Tab 30 Tablet 1     Sig: Take 1 Tablet by mouth 3 times a day as needed for Anxiety.     Authorizing Provider: MYNOR BISWAS A.P.R.N.

## 2021-12-22 ENCOUNTER — HOSPITAL ENCOUNTER (OUTPATIENT)
Dept: LAB | Facility: MEDICAL CENTER | Age: 36
End: 2021-12-22
Attending: NURSE PRACTITIONER
Payer: COMMERCIAL

## 2021-12-22 DIAGNOSIS — I26.94 MULTIPLE SUBSEGMENTAL PULMONARY EMBOLI WITHOUT ACUTE COR PULMONALE (HCC): ICD-10-CM

## 2021-12-22 LAB
ALBUMIN SERPL BCP-MCNC: 4.2 G/DL (ref 3.2–4.9)
ALBUMIN/GLOB SERPL: 1.6 G/DL
ALP SERPL-CCNC: 64 U/L (ref 30–99)
ALT SERPL-CCNC: 17 U/L (ref 2–50)
ANION GAP SERPL CALC-SCNC: 11 MMOL/L (ref 7–16)
AST SERPL-CCNC: 16 U/L (ref 12–45)
BASOPHILS # BLD AUTO: 0.5 % (ref 0–1.8)
BASOPHILS # BLD: 0.03 K/UL (ref 0–0.12)
BILIRUB SERPL-MCNC: 0.7 MG/DL (ref 0.1–1.5)
BUN SERPL-MCNC: 13 MG/DL (ref 8–22)
CALCIUM SERPL-MCNC: 8.9 MG/DL (ref 8.5–10.5)
CHLORIDE SERPL-SCNC: 105 MMOL/L (ref 96–112)
CO2 SERPL-SCNC: 21 MMOL/L (ref 20–33)
CREAT SERPL-MCNC: 0.72 MG/DL (ref 0.5–1.4)
EOSINOPHIL # BLD AUTO: 0.16 K/UL (ref 0–0.51)
EOSINOPHIL NFR BLD: 2.9 % (ref 0–6.9)
ERYTHROCYTE [DISTWIDTH] IN BLOOD BY AUTOMATED COUNT: 46.5 FL (ref 35.9–50)
GLOBULIN SER CALC-MCNC: 2.6 G/DL (ref 1.9–3.5)
GLUCOSE SERPL-MCNC: 87 MG/DL (ref 65–99)
HCT VFR BLD AUTO: 42.8 % (ref 37–47)
HGB BLD-MCNC: 13.6 G/DL (ref 12–16)
IMM GRANULOCYTES # BLD AUTO: 0.02 K/UL (ref 0–0.11)
IMM GRANULOCYTES NFR BLD AUTO: 0.4 % (ref 0–0.9)
LYMPHOCYTES # BLD AUTO: 1.47 K/UL (ref 1–4.8)
LYMPHOCYTES NFR BLD: 26.9 % (ref 22–41)
MCH RBC QN AUTO: 28.3 PG (ref 27–33)
MCHC RBC AUTO-ENTMCNC: 31.8 G/DL (ref 33.6–35)
MCV RBC AUTO: 89 FL (ref 81.4–97.8)
MONOCYTES # BLD AUTO: 0.4 K/UL (ref 0–0.85)
MONOCYTES NFR BLD AUTO: 7.3 % (ref 0–13.4)
NEUTROPHILS # BLD AUTO: 3.38 K/UL (ref 2–7.15)
NEUTROPHILS NFR BLD: 62 % (ref 44–72)
NRBC # BLD AUTO: 0 K/UL
NRBC BLD-RTO: 0 /100 WBC
PLATELET # BLD AUTO: 283 K/UL (ref 164–446)
PMV BLD AUTO: 10.9 FL (ref 9–12.9)
POTASSIUM SERPL-SCNC: 4.4 MMOL/L (ref 3.6–5.5)
PROT SERPL-MCNC: 6.8 G/DL (ref 6–8.2)
RBC # BLD AUTO: 4.81 M/UL (ref 4.2–5.4)
SODIUM SERPL-SCNC: 137 MMOL/L (ref 135–145)
WBC # BLD AUTO: 5.5 K/UL (ref 4.8–10.8)

## 2021-12-22 PROCEDURE — 85025 COMPLETE CBC W/AUTO DIFF WBC: CPT

## 2021-12-22 PROCEDURE — 80053 COMPREHEN METABOLIC PANEL: CPT

## 2021-12-22 PROCEDURE — 36415 COLL VENOUS BLD VENIPUNCTURE: CPT

## 2021-12-23 ENCOUNTER — PRE-ADMISSION TESTING (OUTPATIENT)
Dept: ADMISSIONS | Facility: MEDICAL CENTER | Age: 36
End: 2021-12-23
Attending: ORTHOPAEDIC SURGERY
Payer: COMMERCIAL

## 2021-12-23 RX ORDER — ACETAMINOPHEN 500 MG
500-1000 TABLET ORAL EVERY 6 HOURS PRN
COMMUNITY
End: 2022-04-25

## 2021-12-23 ASSESSMENT — FIBROSIS 4 INDEX: FIB4 SCORE: 0.49

## 2021-12-23 NOTE — OR NURSING
Patient here for pre-admit appointment for surgery 1/4/2022. Patient states she will be non-weight bearing post op; she has all needed equipment including scooter, crutches, shower chair, etc. Denies need to see Case Coordination RN for any further needs.

## 2021-12-23 NOTE — DISCHARGE PLANNING
Per preadmission RN Darrin, pt declined to talk to CM. RN reports that patient has a scooter and crutches.

## 2022-01-04 ENCOUNTER — HOSPITAL ENCOUNTER (OUTPATIENT)
Facility: MEDICAL CENTER | Age: 37
Setting detail: OUTPATIENT SURGERY
End: 2022-01-04
Attending: ORTHOPAEDIC SURGERY | Admitting: ORTHOPAEDIC SURGERY
Payer: COMMERCIAL

## 2022-01-04 ENCOUNTER — APPOINTMENT (OUTPATIENT)
Dept: RADIOLOGY | Facility: MEDICAL CENTER | Age: 37
End: 2022-01-04
Attending: ORTHOPAEDIC SURGERY
Payer: COMMERCIAL

## 2022-01-04 VITALS
DIASTOLIC BLOOD PRESSURE: 101 MMHG | SYSTOLIC BLOOD PRESSURE: 155 MMHG | HEART RATE: 90 BPM | TEMPERATURE: 97.1 F | BODY MASS INDEX: 41.95 KG/M2 | RESPIRATION RATE: 15 BRPM | WEIGHT: 293 LBS | OXYGEN SATURATION: 100 % | HEIGHT: 70 IN

## 2022-01-04 DIAGNOSIS — M25.371 ANKLE INSTABILITY, RIGHT: ICD-10-CM

## 2022-01-04 LAB
EXTERNAL QUALITY CONTROL: NORMAL
HCG UR QL: NEGATIVE
SARS-COV+SARS-COV-2 AG RESP QL IA.RAPID: POSITIVE

## 2022-01-04 PROCEDURE — 87426 SARSCOV CORONAVIRUS AG IA: CPT | Performed by: ORTHOPAEDIC SURGERY

## 2022-01-04 PROCEDURE — 81025 URINE PREGNANCY TEST: CPT

## 2022-01-04 RX ORDER — CEFAZOLIN SODIUM 1 G/3ML
3 INJECTION, POWDER, FOR SOLUTION INTRAMUSCULAR; INTRAVENOUS ONCE
Status: DISCONTINUED | OUTPATIENT
Start: 2022-01-04 | End: 2022-01-04 | Stop reason: HOSPADM

## 2022-01-04 RX ORDER — SODIUM CHLORIDE, SODIUM LACTATE, POTASSIUM CHLORIDE, CALCIUM CHLORIDE 600; 310; 30; 20 MG/100ML; MG/100ML; MG/100ML; MG/100ML
INJECTION, SOLUTION INTRAVENOUS CONTINUOUS
Status: DISCONTINUED | OUTPATIENT
Start: 2022-01-04 | End: 2022-01-04 | Stop reason: HOSPADM

## 2022-01-04 ASSESSMENT — FIBROSIS 4 INDEX: FIB4 SCORE: 0.49

## 2022-01-04 NOTE — LETTER
January 4, 2022    Patient Name: Kathi Guan  Surgeon Name: William Abdullahi M.D.  Surgery Facility: [facility] ([address] [facility])  Surgery Date: 1/4/2022    The time of your surgery is not final and may change up to and until the day of your surgery. You will be contacted 24-48 hours prior to your surgery date with your check-in and surgery time.    If you will not be at one of the below numbers please call/text the surgery scheduler at [phone]  Preferred Phone: [phone]    BEFORE YOUR SURGERY  Pre Registration and/or Lab Work must be done within and no earlier than 28 days prior to your surgery date. Please call [facility] at [phone] for an appointment as soon as possible.     COVID test required 4-7 days prior to surgery, failure to do so can result in a cancellation.    The following locations offer COVID testing:    Approved facilities for COVID testing, if scheduled at [facility]:  · [facility] from 7:30am-3:30pm at [address], [place]  · [facility] [phone] (Please call for an appointment)  · Your local pharmacy    Not scheduled at [facility] contact the scheduled facility for approved testing facilities.    Pre op Appointment:    Instructions: Bring a list of all medications you are taking including the dosing and frequency.    - Your Post-Op Packet and necessary DME will be available for pick-up the day prior to surgery. Please let your provider's MA know at [phone] if you want to pick-up your prescriptions prior to surgery, hand written narcotics must be filled in [place]. If you do not  the prescription prior to surgery you will receive it at surgery.    Please refrain from smoking any substance after midnight prior to surgery. Smoking may interfere with the anesthetic and frequently produces nausea during the recovery period.    Continue taking all lifesaving medications. Including the morning  of your surgery with small sip of water.    Please read the MEDICATION INSTRUCTIONS below completely.    DAY OF YOUR SURGERY  Nothing to eat or drink after midnight     Please arrive at the hospital/surgery center at the check-in time provided.     An adult will need to bring you and take you home after your surgery.     AFTER YOUR SURGERY  Post op Appointment:   Date: 02/07/22   Time: 08:15AM   With: William Abdullahi M.D.   Location: [address] [place] [id number]    TIME OFF WORK  FMLA or Disability forms can be faxed directly to: [phone] or you may drop them off at [address] [place] [address]. Our office charges a $35.00 fee per form. Forms will be completed within 10 business days of drop off and payment received. For the status of your forms you may contact our disability office directly [phone].    MEDICATION INSTRUCTIONS  The following medications should be stopped a minimum of 10 days prior to surgery:  All over the counter, Supplements & Herbal medications    Anorectics: Phentermine (Adipex-P, Lomaira and Suprenza), Phentermine-topiramate (Qsymia), Bupropion-naltrexone (Contrave)    Opiod Partial Agonists/Opioid Antagonists: Buprenorphine (Subocone, Belbuca, Butrans, Probuphine Implant, Sublocade), Naltrexone (ReVia, Vivitrol), Naloxone    Amphetamines: Dextroamphetamine/Amphetamine (Adderall, Mydayis), Methylphenidate Hydrochloride (Concerta, Metadate, Methylin, Ritalin)    The following medications should be stopped 5 days prior to surgery:  Blood Thinners: Any Aspirin, Aspirin products, anti-inflammatories such as ibuprofen and any blood thinners such as Coumadin and Plavix. Please consult your prescribing physician if you are on life saving blood thinners, in regards to when to stop medications prior to surgery.     The following medications should be stopped a minimum of 3 days prior to surgery:  PDE-5 inhibitors: Sildenafil (Viagra), Tadalafil (Cialis), Vardenafil  (Levitra), Avanafil (Stendra)    MAO Inhibitors: Rasagiline (Azilect), Selegiline (Eldepryl, Emsam, Selapar), Isocarboxazid (Marplan), Phenelzine (Nardil)

## 2022-01-04 NOTE — LETTER
December 21, 2021    Patient Name: Kathi Guan  Surgeon Name: William Abdullahi M.D.  Surgery Facility: [facility] ([address] [facility])  Surgery Date: 1/4/2022    The time of your surgery is not final and may change up to and until the day of your surgery. You will be contacted 24-48 hours prior to your surgery date with your check-in and surgery time.    If you will not be at one of the below numbers please call/text the surgery scheduler at [phone]  Preferred Phone: [phone]    BEFORE YOUR SURGERY  Pre Registration and/or Lab Work must be done within and no earlier than 28 days prior to your surgery date. Please call [facility] at [phone] for an appointment as soon as possible.     COVID testing needs to be done 4-7 days prior to surgery, failure to do so can result in surgery being cancelled.    Pre op Appointment:    Instructions: Bring a list of all medications you are taking including the dosing and frequency.    - Your Post-Op Packet and necessary DME will be available for pick-up the day prior to surgery. Please let your provider's MA know at [phone] if you want to pick-up your prescriptions prior to surgery, hand written narcotics must be filled in [place]. If you do not  the prescription prior to surgery you will receive it at surgery.    Please refrain from smoking any substance after midnight prior to surgery. Smoking may interfere with the anesthetic and frequently produces nausea during the recovery period.    Continue taking all lifesaving medications. Including the morning of your surgery with small sip of water.    Please read the MEDICATION INSTRUCTIONS below completely.    DAY OF YOUR SURGERY  Nothing to eat or drink after midnight     Please arrive at the hospital/surgery center at the check-in time provided.     An adult will need to bring you and take you home after your surgery.     AFTER YOUR SURGERY  Post op  Appointment:   Date: 01/17/22   Time: 08:15AM   With: Surgeon(s):  William Abdullahi M.D.   Location: [address] [place] [id number]    TIME OFF WORK  FMLA or Disability forms can be faxed directly to: [phone] or you may drop them off at [address] [place] [address]. Our office charges a $35.00 fee per form. Forms will be completed within 10 business days of drop off and payment received. For the status of your forms you may contact our disability office directly [phone].    MEDICATION INSTRUCTIONS  The following medications should be stopped a minimum of 10 days prior to surgery:  All over the counter, Supplements & Herbal medications    Anorectics: Phentermine (Adipex-P, Lomaira and Suprenza), Phentermine-topiramate (Qsymia), Bupropion-naltrexone (Contrave)    Opiod Partial Agonists/Opioid Antagonists: Buprenorphine (Subocone, Belbuca, Butrans, Probuphine Implant, Sublocade), Naltrexone (ReVia, Vivitrol), Naloxone    Amphetamines: Dextroamphetamine/Amphetamine (Adderall, Mydayis), Methylphenidate Hydrochloride (Concerta, Metadate, Methylin, Ritalin)    The following medications should be stopped 5 days prior to surgery:  Blood Thinners: Any Aspirin, Aspirin products, anti-inflammatories such as ibuprofen and any blood thinners such as Coumadin and Plavix. Please consult your prescribing physician if you are on life saving blood thinners, in regards to when to stop medications prior to surgery.     The following medications should be stopped a minimum of 3 days prior to surgery:  PDE-5 inhibitors: Sildenafil (Viagra), Tadalafil (Cialis), Vardenafil (Levitra), Avanafil (Stendra)    MAO Inhibitors: Rasagiline (Azilect), Selegiline (Eldepryl, Emsam, Selapar), Isocarboxazid (Marplan), Phenelzine (Nardil)

## 2022-01-04 NOTE — DISCHARGE INSTRUCTIONS
INSTRUCTIONS FOR COVID-19 OR ANY OTHER INFECTIOUS RESPIRATORY ILLNESSES    The Centers for Disease Control and Prevention (CDC) states that early indications for COVID-19 include cough, shortness of breath, difficulty breathing, or at least two of the following symptoms: chills, shaking with chills, muscle pain, headache, sore throat, and loss of taste or smell. Symptoms can range from mild to severe and may appear up to two weeks after exposure to the virus.    The practice of self-isolation and quarantine helps protect the public and your family by  preventing exposure to people who have or may have a contagious disease. Please follow the prevention steps below as based on CDC guidelines:    WHEN TO STOP ISOLATION: Persons with COVID-19 or any other infectious respiratory illness who have symptoms and were advised to care for themselves at home may discontinue home isolation under the following conditions:  · At least 24 hours have passed since recovery defined as resolution of fever without the use of fever-reducing medications; AND,  · Improvement in respiratory symptoms (e.g., cough, shortness of breath); AND,  · At least 10 days have passed since symptoms first appeared and have had no subsequent illness.    MONITOR YOUR SYMPTOMS: If your illness is worsening, seek prompt medical attention. If you have a medical emergency and need to call 911, notify the dispatch personnel that you have, or are being evaluated for confirmed or suspected COVID-19 or another infectious respiratory illness. Wear a facemask if possible.    ACTIVITY RESTRICTION: restrict activities outside your home, except for getting medical care. Do not go to work, school, or public areas. Avoid using public transportation, ride-sharing, or taxis.    SCHEDULED MEDICAL APPOINTMENTS: Notify your provider that you have, or are being evaluated for, confirmed or suspected COVID-19 or another infectious respiratory. This will help the healthcare  provider’s office safely take care of you and keep other people from getting exposed or infected.    FACEMASKS, when to wear: Anytime you are away from your home or around other people or pets. If you are unable to wear one, maintain a minimum of 6 feet distancing from others.    LIVING ENVIRONMENT: Stay in a separate room from other people and pets. If possible, use a separate bathroom, have someone else care for your pets and avoid sharing household items. Any items used should be washed thoroughly with soap and water. Clean all “high-touch” surfaces every day. Use a household cleaning spray or wipe, according to the label instructions. High touch surfaces include (but are not limited to) counters, tabletops, doorknobs, bathroom fixtures, toilets, phones, keyboards, tablets, and bedside tables.     HAND WASHING: Frequently wash hands with soap and water for at least 20 seconds,  especially after blowing your nose, coughing, or sneezing; going to the bathroom; before and after interacting with pets; and before and after eating or preparing food. If hands are visibly dirty use soap and water. If soap and water are not available, use an alcohol-based hand  with at least 60% alcohol. Avoid touching your eyes, nose, and mouth with unwashed hands. Cover your coughs and sneezes with a tissue. Throw used tissues in a lined trash can. Immediately wash your hands.    ACTIVE/FACILITATED SELF-MONITORING: Follow instructions provided by your local health department or health professionals, as appropriate. When working with your local health department check their available hours.    [facility]   Phone Number   [facility] [phone]   [facility] [place] [phone]   [place] Call 211   [place] [phone]     IF YOU HAVE CONFIRMED POSITIVE COVID-19:    Those who have completely recovered from COVID-19 may have immune-boosting antibodies in their plasma--called “convalescent plasma”--that could be  used to treat critically ill COVID19 patients.    [facility] is excited to begin working with [facility] on collecting convalescent plasma from  people who have recovered from COVID-19 as part of a program to treat patients infected with the virus. This FDA-approved “emergency investigational new drug” is a special blood product containing antibodies that may give patients an extra boost to fight the virus.    To be eligible to donate convalescent plasma, you must have a prior COVID-19 diagnosis documented by a laboratory test (or a positive test result for SARS-CoV-2 antibodies) and meet additional eligibility requirements.    If you are interested in donating convalescent plasma or have any additional questions, please contact the [facility] Convalescent Plasma  at [phone] or via e-mail at [email or web address].

## 2022-01-04 NOTE — OR NURSING
Patient poct resulted positive for Covid 19.  Patient placed in isolation with proper precautions. This RN notified surgeon and anesthesiologist. Case canceled.  Patient and family notified and education about isolation procedures and discharge paperwork provided regarding positive covid 19 results.

## 2022-01-04 NOTE — LETTER
January 4, 2022    Patient Name: Kathi Guan  Surgeon Name: William Abdullahi M.D.  Surgery Facility: [facility] ([address] [facility])  Surgery Date: 1/4/2022    The time of your surgery is not final and may change up to and until the day of your surgery. You will be contacted 24-48 hours prior to your surgery date with your check-in and surgery time.    If you will not be at one of the below numbers please call/text the surgery scheduler at [phone]  Preferred Phone: [phone]    BEFORE YOUR SURGERY  Pre Registration and/or Lab Work must be done within and no earlier than 28 days prior to your surgery date. Please call [facility] at [phone] for an appointment as soon as possible.     COVID test required 4-7 days prior to surgery, failure to do so can result in a cancellation.    The following locations offer COVID testing:    Approved facilities for COVID testing, if scheduled at [facility]:  · [facility] from 7:30am-3:30pm at [address] [place], [place]  · [facility] [phone] (Please call for an appointment)  · Your local pharmacy    Not scheduled at [facility] contact the scheduled facility for approved testing facilities.    Pre op Appointment:    Instructions: Bring a list of all medications you are taking including the dosing and frequency.    - Your Post-Op Packet and necessary DME will be available for pick-up the day prior to surgery. Please let your provider's MA know at [phone] if you want to pick-up your prescriptions prior to surgery, hand written narcotics must be filled in [place]. If you do not  the prescription prior to surgery you will receive it at surgery.    Please refrain from smoking any substance after midnight prior to surgery. Smoking may interfere with the anesthetic and frequently produces nausea during the recovery period.    Continue taking all lifesaving medications. Including the morning  of your surgery with small sip of water.    Please read the MEDICATION INSTRUCTIONS below completely.    DAY OF YOUR SURGERY  Nothing to eat or drink after midnight     Please arrive at the hospital/surgery center at the check-in time provided.     An adult will need to bring you and take you home after your surgery.     AFTER YOUR SURGERY  Post op Appointment:   Date: 02/07/22   Time: 08:15AM   With: William Abdullahi M.D.   Location: [address] [place] [address]    TIME OFF WORK  FMLA or Disability forms can be faxed directly to: [phone] or you may drop them off at [address] [place] [facility] [place] [address]. Our office charges a $35.00 fee per form. Forms will be completed within 10 business days of drop off and payment received. For the status of your forms you may contact our disability office directly [phone].    MEDICATION INSTRUCTIONS  The following medications should be stopped a minimum of 10 days prior to surgery:  All over the counter, Supplements & Herbal medications    Anorectics: Phentermine (Adipex-P, Lomaira and Suprenza), Phentermine-topiramate (Qsymia), Bupropion-naltrexone (Contrave)    Opiod Partial Agonists/Opioid Antagonists: Buprenorphine (Subocone, Belbuca, Butrans, Probuphine Implant, Sublocade), Naltrexone (ReVia, Vivitrol), Naloxone    Amphetamines: Dextroamphetamine/Amphetamine (Adderall, Mydayis), Methylphenidate Hydrochloride (Concerta, Metadate, Methylin, Ritalin)    The following medications should be stopped 5 days prior to surgery:  Blood Thinners: Any Aspirin, Aspirin products, anti-inflammatories such as ibuprofen and any blood thinners such as Coumadin and Plavix. Please consult your prescribing physician if you are on life saving blood thinners, in regards to when to stop medications prior to surgery.     The following medications should be stopped a minimum of 3 days prior to surgery:  PDE-5 inhibitors: Sildenafil (Viagra), Tadalafil (Cialis), Vardenafil  (Levitra), Avanafil (Stendra)    MAO Inhibitors: Rasagiline (Azilect), Selegiline (Eldepryl, Emsam, Selapar), Isocarboxazid (Marplan), Phenelzine (Nardil)

## 2022-01-04 NOTE — LETTER
January 4, 2022    Patient Name: Kathi Guan  Surgeon Name: William Abdullahi M.D.  Surgery Facility: [facility] ([address] [facility])  Surgery Date: 1/25/2022    The time of your surgery is not final and may change up to and until the day of your surgery. You will be contacted 24-48 hours prior to your surgery date with your check-in and surgery time.    If you will not be at one of the below numbers please call/text the surgery scheduler at [phone]  Preferred Phone: [phone]    BEFORE YOUR SURGERY  Pre Registration and/or Lab Work must be done within and no earlier than 28 days prior to your surgery date. Please call [facility] at [phone] for an appointment as soon as possible.     COVID test required 4-7 days prior to surgery, failure to do so can result in a cancellation.    The following locations offer COVID testing:    Approved facilities for COVID testing, if scheduled at [facility]:  · [facility] from 7:30am-3:30pm at [address] [place], [place]  · [facility] [phone] (Please call for an appointment)  · Your local pharmacy    Not scheduled at [facility] contact the scheduled facility for approved testing facilities.    Pre op Appointment:    Instructions: Bring a list of all medications you are taking including the dosing and frequency.    - Your Post-Op Packet and necessary DME will be available for pick-up the day prior to surgery. Please let your provider's MA know at [phone] if you want to pick-up your prescriptions prior to surgery, hand written narcotics must be filled in [place]. If you do not  the prescription prior to surgery you will receive it at surgery.    Please refrain from smoking any substance after midnight prior to surgery. Smoking may interfere with the anesthetic and frequently produces nausea during the recovery period.    Continue taking all lifesaving medications. Including the  morning of your surgery with small sip of water.    Please read the MEDICATION INSTRUCTIONS below completely.    DAY OF YOUR SURGERY  Nothing to eat or drink after midnight     Please arrive at the hospital/surgery center at the check-in time provided.     An adult will need to bring you and take you home after your surgery.     AFTER YOUR SURGERY  Post op Appointment:   Date: 02/07/22   Time: 08:15AM   With: William Abdullahi M.D.   Location: [address] [place] [address]    TIME OFF WORK  FMLA or Disability forms can be faxed directly to: [phone] or you may drop them off at [address] [place] [address]. Our office charges a $35.00 fee per form. Forms will be completed within 10 business days of drop off and payment received. For the status of your forms you may contact our disability office directly [phone].    MEDICATION INSTRUCTIONS  The following medications should be stopped a minimum of 10 days prior to surgery:  All over the counter, Supplements & Herbal medications    Anorectics: Phentermine (Adipex-P, Lomaira and Suprenza), Phentermine-topiramate (Qsymia), Bupropion-naltrexone (Contrave)    Opiod Partial Agonists/Opioid Antagonists: Buprenorphine (Subocone, Belbuca, Butrans, Probuphine Implant, Sublocade), Naltrexone (ReVia, Vivitrol), Naloxone    Amphetamines: Dextroamphetamine/Amphetamine (Adderall, Mydayis), Methylphenidate Hydrochloride (Concerta, Metadate, Methylin, Ritalin)    The following medications should be stopped 5 days prior to surgery:  Blood Thinners: Any Aspirin, Aspirin products, anti-inflammatories such as ibuprofen and any blood thinners such as Coumadin and Plavix. Please consult your prescribing physician if you are on life saving blood thinners, in regards to when to stop medications prior to surgery.     The following medications should be stopped a minimum of 3 days prior to surgery:  PDE-5 inhibitors: Sildenafil (Viagra), Tadalafil (Cialis), Vardenafil  (Levitra), Avanafil (Stendra)    MAO Inhibitors: Rasagiline (Azilect), Selegiline (Eldepryl, Emsam, Selapar), Isocarboxazid (Marplan), Phenelzine (Nardil)

## 2022-01-05 ENCOUNTER — TELEPHONE (OUTPATIENT)
Dept: VASCULAR LAB | Facility: MEDICAL CENTER | Age: 37
End: 2022-01-05

## 2022-01-05 NOTE — TELEPHONE ENCOUNTER
Pt was scheduled for ankle arthroscopy on 1/4/22 - procedure was cancelled d/t pt testing positive for COVID.    Pt held Eliquis x 48 hrs prior to scheduled procedure. Counseled pt to resume Eliquis immediately.    Procedure rescheduled for 1/25/22. Advised pt to hold Eliquis x 48 hrs prior to procedure once again.    Modified LOT appt to 3 months post procedure.    Jas Brambila, NickolasD, BCACP

## 2022-01-19 ENCOUNTER — OFFICE VISIT (OUTPATIENT)
Dept: URGENT CARE | Facility: PHYSICIAN GROUP | Age: 37
End: 2022-01-19
Payer: COMMERCIAL

## 2022-01-19 VITALS
RESPIRATION RATE: 16 BRPM | TEMPERATURE: 97.7 F | WEIGHT: 293 LBS | DIASTOLIC BLOOD PRESSURE: 84 MMHG | SYSTOLIC BLOOD PRESSURE: 134 MMHG | BODY MASS INDEX: 43.4 KG/M2 | HEART RATE: 80 BPM | OXYGEN SATURATION: 100 % | HEIGHT: 69 IN

## 2022-01-19 DIAGNOSIS — J20.8 ACUTE BRONCHITIS DUE TO COVID-19 VIRUS: ICD-10-CM

## 2022-01-19 DIAGNOSIS — J98.01 BRONCHOSPASM: ICD-10-CM

## 2022-01-19 DIAGNOSIS — U07.1 ACUTE BRONCHITIS DUE TO COVID-19 VIRUS: ICD-10-CM

## 2022-01-19 DIAGNOSIS — R05.9 COUGH: ICD-10-CM

## 2022-01-19 PROCEDURE — 99214 OFFICE O/P EST MOD 30 MIN: CPT | Mod: CS | Performed by: FAMILY MEDICINE

## 2022-01-19 RX ORDER — BENZONATATE 100 MG/1
100 CAPSULE ORAL 3 TIMES DAILY PRN
Qty: 30 CAPSULE | Refills: 0 | Status: ON HOLD | OUTPATIENT
Start: 2022-01-19 | End: 2022-02-15

## 2022-01-19 RX ORDER — ALBUTEROL SULFATE 90 UG/1
1-2 AEROSOL, METERED RESPIRATORY (INHALATION) EVERY 4 HOURS PRN
Qty: 1 EACH | Refills: 1 | Status: SHIPPED | OUTPATIENT
Start: 2022-01-19

## 2022-01-19 ASSESSMENT — ENCOUNTER SYMPTOMS
SHORTNESS OF BREATH: 0
MYALGIAS: 0
DIZZINESS: 0
NAUSEA: 0
WHEEZING: 1
COUGH: 1
CHILLS: 0
FEVER: 0
VOMITING: 0
SORE THROAT: 0

## 2022-01-19 ASSESSMENT — FIBROSIS 4 INDEX: FIB4 SCORE: 0.49

## 2022-01-19 NOTE — PROGRESS NOTES
Subjective:   Kathi Guan is a 36 y.o. female who presents for Other (Tested positive for COVID on January 4th and tested negative again in January 10th but been feeling SOB, wheezing and soreness on her chest)        URI   Chronicity: Complains of persistent coughing since recent COVID-19 positive January 04, 2022. Episode onset: 2 weeks. The problem has been waxing and waning. Associated symptoms include coughing and wheezing. Pertinent negatives include no nausea, rash, sore throat or vomiting. Associated symptoms comments: Complains of cough, intermittent wheezing similar to episodes of asthma precipitated by smoke    Complains of aches worse with coughing    States history of previous pulmonary embolism and is currently compliant with anticoagulation medications of apixaban. Treatments tried: Relative rest. The treatment provided no relief.     PMH:  has a past medical history of Asthma, Blood clotting disorder (HCC) (09/2021), and Psychiatric problem (12/23/2021). She also has no past medical history of Diabetes ([facility]), Hyperlipidemia, Hypertension, Kidney disease, or Thyroid disease.  MEDS:   Current Outpatient Medications:   •  benzonatate (TESSALON) 100 MG Cap, Take 1 Capsule by mouth 3 times a day as needed for Cough., Disp: 30 Capsule, Rfl: 0  •  albuterol 108 (90 Base) MCG/ACT Aero Soln inhalation aerosol, Inhale 1-2 Puffs every four hours as needed for Shortness of Breath., Disp: 1 Each, Rfl: 1  •  apixaban (ELIQUIS) 5mg Tab, Take 1 Tablet by mouth 2 times a day., Disp: 60 Tablet, Rfl: 3  •  acetaminophen (TYLENOL) 500 MG Tab, Take 500-1,000 mg by mouth every 6 hours as needed., Disp: , Rfl:   •  MILK THISTLE PO, Take 1 Tablet by mouth every day., Disp: , Rfl:   ALLERGIES:   Allergies   Allergen Reactions   • Amoxicillin Hives     RXN= as a child   • Penicillins Hives     RXN=as a child     SURGHX:   Past Surgical History:   Procedure Laterality Date   • OTHER  2007    lasik eye surgery   •  "OTHER  2005    wisdom teeth removed     SOCHX:  reports that she has never smoked. She has never used smokeless tobacco. She reports current alcohol use. She reports previous drug use. Drugs: Marijuana and Inhaled.  FH:   Family History   Problem Relation Age of Onset   • No Known Problems Mother    • Hyperlipidemia Father    • Hypertension Father    • No Known Problems Sister    • Cancer Paternal Aunt         breast cancer   • Diabetes Paternal Uncle    • Cancer Maternal Grandmother         bone   • Asthma Maternal Grandmother    • Arthritis Maternal Grandmother    • Dementia Paternal Grandmother    • Hypertension Paternal Grandmother    • Diabetes Paternal Grandmother    • Hyperlipidemia Paternal Grandmother    • Stroke Paternal Grandmother    • Heart Disease Paternal Grandfather         MI x 4   • No Known Problems Half-brother    • No Known Problems Son      Review of Systems   Constitutional: Negative for chills and fever.   HENT: Negative for sore throat.    Respiratory: Positive for cough and wheezing. Negative for shortness of breath.    Gastrointestinal: Negative for nausea and vomiting.   Musculoskeletal: Negative for myalgias.   Skin: Negative for rash.   Neurological: Negative for dizziness.        Objective:   /84 (BP Location: Left arm, Patient Position: Sitting, BP Cuff Size: Large adult)   Pulse 80   Temp 36.5 °C (97.7 °F) (Temporal)   Resp 16   Ht 1.753 m (5' 9\")   Wt (!) 135 kg (297 lb)   LMP 12/28/2021   SpO2 100%   BMI 43.86 kg/m²   Physical Exam  Vitals and nursing note reviewed.   Constitutional:       General: She is not in acute distress.     Appearance: She is well-developed.   HENT:      Head: Normocephalic and atraumatic.      Right Ear: External ear normal.      Left Ear: External ear normal.      Nose: Nose normal.      Mouth/Throat:      Mouth: Mucous membranes are moist.   Eyes:      Conjunctiva/sclera: Conjunctivae normal.   Cardiovascular:      Rate and Rhythm: Normal " rate.   Pulmonary:      Effort: Pulmonary effort is normal. No respiratory distress.      Breath sounds: Wheezing (Minimal end expiratory) present. No rhonchi.   Abdominal:      General: There is no distension.   Musculoskeletal:         General: Normal range of motion.   Skin:     General: Skin is warm and dry.   Neurological:      General: No focal deficit present.      Mental Status: She is alert and oriented to person, place, and time. Mental status is at baseline.      Gait: Gait (gait at baseline) normal.   Psychiatric:         Judgment: Judgment normal.           Assessment/Plan:   1. Acute bronchitis due to COVID-19 virus  - benzonatate (TESSALON) 100 MG Cap; Take 1 Capsule by mouth 3 times a day as needed for Cough.  Dispense: 30 Capsule; Refill: 0    2. Cough  - benzonatate (TESSALON) 100 MG Cap; Take 1 Capsule by mouth 3 times a day as needed for Cough.  Dispense: 30 Capsule; Refill: 0    3. Bronchospasm  - albuterol 108 (90 Base) MCG/ACT Aero Soln inhalation aerosol; Inhale 1-2 Puffs every four hours as needed for Shortness of Breath.  Dispense: 1 Each; Refill: 1    Medical decision-making/course: The patient remained afebrile, hemodynamically and neurologically stable with no evidence of respiratory compromise throughout the urgent care course.  There was no immediate clinical indication for the necessity of emergency department evaluation or inpatient admission and the patient was amendable to a trial of outpatient management.        In the course of preparing for this visit with review of the pertinent past medical history, recent and past clinic visits, current medications, and performing chart, immunization, medical history and medication reconciliation, and in the further course of obtaining the current history pertinent to the clinic visit today, performing an exam and evaluation, ordering and independently evaluating labs, tests  , and/or procedures, prescribing any recommended new medications as  noted above, providing any pertinent counseling and education and recommending further coordination of care including recommendations to return for any persistent symptoms or go directly to emergency department for any worsening symptoms, at least  20 minutes of total time were spent during this encounter.      Discussed close monitoring, return precautions, and supportive measures of maintaining adequate fluid hydration and caloric intake, relative rest and symptom management as needed for pain and/or fever.    Differential diagnosis including in the context of symptomatology consistent with COVID bronchitis and in the further context patient is currently compliant with anticoagulation medications of apixaban, discussed the unlikely occurrence of acute pulmonary embolism and recommendations to return or go directly to the emergency department for any persistent or worsening symptoms and specifically any limiting dyspnea, elevated heart rate, difficulty breathing or any other concerns, natural history, supportive care, and indications for immediate follow-up discussed.     Advised the patient to follow-up with the primary care physician for recheck, reevaluation, and consideration of further management.    Please note that this dictation was created using voice recognition software. I have worked with consultants from the vendor as well as technical experts from [email or web address] to optimize the interface. I have made every reasonable attempt to correct obvious errors, but I expect that there are errors of grammar and possibly content that I did not discover before finalizing the note.

## 2022-01-19 NOTE — PATIENT INSTRUCTIONS
INSTRUCTIONS FOR COVID-19 OR ANY OTHER INFECTIOUS RESPIRATORY ILLNESSES    The Centers for Disease Control and Prevention (CDC) states that early indications for COVID-19 include cough, shortness of breath, difficulty breathing, or at least two of the following symptoms: chills, shaking with chills, muscle pain, headache, sore throat, and loss of taste or smell. Symptoms can range from mild to severe and may appear up to two weeks after exposure to the virus.    The practice of self-isolation and quarantine helps protect the public and your family by  preventing exposure to people who have or may have a contagious disease. Please follow the prevention steps below as based on CDC guidelines:    WHEN TO STOP ISOLATION: Persons with COVID-19 or any other infectious respiratory illness who have symptoms and were advised to care for themselves at home may discontinue home isolation under the following conditions:  · At least 24 hours have passed since recovery defined as resolution of fever without the use of fever-reducing medications; AND,  · Improvement in respiratory symptoms (e.g., cough, shortness of breath); AND,  · At least 10 days have passed since symptoms first appeared and have had no subsequent illness.    MONITOR YOUR SYMPTOMS: If your illness is worsening, seek prompt medical attention. If you have a medical emergency and need to call 911, notify the dispatch personnel that you have, or are being evaluated for confirmed or suspected COVID-19 or another infectious respiratory illness. Wear a facemask if possible.    ACTIVITY RESTRICTION: restrict activities outside your home, except for getting medical care. Do not go to work, school, or public areas. Avoid using public transportation, ride-sharing, or taxis.    SCHEDULED MEDICAL APPOINTMENTS: Notify your provider that you have, or are being evaluated for, confirmed or suspected COVID-19 or another infectious respiratory. This will help the healthcare  provider’s office safely take care of you and keep other people from getting exposed or infected.    FACEMASKS, when to wear: Anytime you are away from your home or around other people or pets. If you are unable to wear one, maintain a minimum of 6 feet distancing from others.    LIVING ENVIRONMENT: Stay in a separate room from other people and pets. If possible, use a separate bathroom, have someone else care for your pets and avoid sharing household items. Any items used should be washed thoroughly with soap and water. Clean all “high-touch” surfaces every day. Use a household cleaning spray or wipe, according to the label instructions. High touch surfaces include (but are not limited to) counters, tabletops, doorknobs, bathroom fixtures, toilets, phones, keyboards, tablets, and bedside tables.     HAND WASHING: Frequently wash hands with soap and water for at least 20 seconds,  especially after blowing your nose, coughing, or sneezing; going to the bathroom; before and after interacting with pets; and before and after eating or preparing food. If hands are visibly dirty use soap and water. If soap and water are not available, use an alcohol-based hand  with at least 60% alcohol. Avoid touching your eyes, nose, and mouth with unwashed hands. Cover your coughs and sneezes with a tissue. Throw used tissues in a lined trash can. Immediately wash your hands.    ACTIVE/FACILITATED SELF-MONITORING: Follow instructions provided by your local health department or health professionals, as appropriate. When working with your local health department check their available hours.    [facility]   Phone Number   [facility] [phone]   [facility] [place] [phone]   [place] [address]   [place] [phone]     IF YOU HAVE CONFIRMED POSITIVE COVID-19:    Those who have completely recovered from COVID-19 may have immune-boosting antibodies in their plasma--called “convalescent plasma”--that could be  used to treat critically ill COVID19 patients.    [facility] is excited to begin working with [facility] on collecting convalescent plasma from  people who have recovered from COVID-19 as part of a program to treat patients infected with the virus. This FDA-approved “emergency investigational new drug” is a special blood product containing antibodies that may give patients an extra boost to fight the virus.    To be eligible to donate convalescent plasma, you must have a prior COVID-19 diagnosis documented by a laboratory test (or a positive test result for SARS-CoV-2 antibodies) and meet additional eligibility requirements.    If you are interested in donating convalescent plasma or have any additional questions, please contact the [facility] Convalescent Plasma  at [phone] or via e-mail at [email or web address].    Bronchitis  Bronchitis is a problem of the air tubes leading to your lungs. This problem makes it hard for air to get in and out of the lungs. You may cough a lot because your air tubes are narrow. Going without care can cause lasting (chronic) bronchitis.  HOME CARE   · Drink enough fluids to keep your pee (urine) clear or pale yellow.  · Use a cool mist humidifier.  · Quit smoking if you smoke. If you keep smoking, the bronchitis might not get better.  · Only take medicine as told by your doctor.  GET HELP RIGHT AWAY IF:   · Coughing keeps you awake.  · You start to wheeze.  · You become more sick or weak.  · You have a hard time breathing or get short of breath.  · You cough up blood.  · Coughing lasts more than 2 weeks.  · You have a fever.  · Your baby is older than 3 months with a rectal temperature of 102° F (38.9° C) or higher.  · Your baby is 3 months old or younger with a rectal temperature of 100.4° F (38° C) or higher.  MAKE SURE YOU:  · Understand these instructions.  · Will watch your condition.  · Will get help right away if you are not doing well or get  worse.  Document Released: 06/05/2009 Document Revised: 03/11/2013 Document Reviewed: 11/19/2010  [employer]® Patient Information ©2014 [employer].

## 2022-02-11 ENCOUNTER — PRE-ADMISSION TESTING (OUTPATIENT)
Dept: ADMISSIONS | Facility: MEDICAL CENTER | Age: 37
End: 2022-02-11
Attending: ORTHOPAEDIC SURGERY
Payer: COMMERCIAL

## 2022-02-11 DIAGNOSIS — Z01.812 PRE-OPERATIVE LABORATORY EXAMINATION: ICD-10-CM

## 2022-02-11 LAB
ERYTHROCYTE [DISTWIDTH] IN BLOOD BY AUTOMATED COUNT: 47.5 FL (ref 35.9–50)
HCT VFR BLD AUTO: 40.5 % (ref 37–47)
HGB BLD-MCNC: 13.3 G/DL (ref 12–16)
MCH RBC QN AUTO: 28.2 PG (ref 27–33)
MCHC RBC AUTO-ENTMCNC: 32.8 G/DL (ref 33.6–35)
MCV RBC AUTO: 86 FL (ref 81.4–97.8)
PLATELET # BLD AUTO: 310 K/UL (ref 164–446)
PMV BLD AUTO: 10.3 FL (ref 9–12.9)
RBC # BLD AUTO: 4.71 M/UL (ref 4.2–5.4)
WBC # BLD AUTO: 7 K/UL (ref 4.8–10.8)

## 2022-02-11 PROCEDURE — 85027 COMPLETE CBC AUTOMATED: CPT

## 2022-02-11 PROCEDURE — 36415 COLL VENOUS BLD VENIPUNCTURE: CPT

## 2022-02-11 RX ORDER — CHOLECALCIFEROL (VITAMIN D3) 125 MCG
5 CAPSULE ORAL
COMMUNITY

## 2022-02-11 RX ORDER — HYDROXYZINE HYDROCHLORIDE 25 MG/1
25 TABLET, FILM COATED ORAL 3 TIMES DAILY PRN
COMMUNITY
End: 2022-04-25

## 2022-02-11 RX ORDER — CETIRIZINE HYDROCHLORIDE 10 MG/1
10 TABLET ORAL DAILY
COMMUNITY

## 2022-02-11 ASSESSMENT — FIBROSIS 4 INDEX: FIB4 SCORE: 0.49

## 2022-02-14 NOTE — OR NURSING
Patient reports to this RN that she was positive for covid on 1/4/2022 and reports only fatigue for approximately 18 hours.  No need to retest for procedure on 2/15/2022.

## 2022-02-15 ENCOUNTER — HOSPITAL ENCOUNTER (OUTPATIENT)
Facility: MEDICAL CENTER | Age: 37
End: 2022-02-15
Attending: ORTHOPAEDIC SURGERY | Admitting: ORTHOPAEDIC SURGERY
Payer: COMMERCIAL

## 2022-02-15 ENCOUNTER — ANESTHESIA (OUTPATIENT)
Dept: SURGERY | Facility: MEDICAL CENTER | Age: 37
End: 2022-02-15
Payer: COMMERCIAL

## 2022-02-15 ENCOUNTER — ANESTHESIA EVENT (OUTPATIENT)
Dept: SURGERY | Facility: MEDICAL CENTER | Age: 37
End: 2022-02-15
Payer: COMMERCIAL

## 2022-02-15 ENCOUNTER — APPOINTMENT (OUTPATIENT)
Dept: RADIOLOGY | Facility: MEDICAL CENTER | Age: 37
End: 2022-02-15
Attending: ORTHOPAEDIC SURGERY
Payer: COMMERCIAL

## 2022-02-15 VITALS
WEIGHT: 293 LBS | RESPIRATION RATE: 14 BRPM | OXYGEN SATURATION: 97 % | SYSTOLIC BLOOD PRESSURE: 132 MMHG | TEMPERATURE: 97.6 F | HEIGHT: 69 IN | DIASTOLIC BLOOD PRESSURE: 79 MMHG | BODY MASS INDEX: 43.4 KG/M2 | HEART RATE: 92 BPM

## 2022-02-15 LAB — HCG UR QL: NEGATIVE

## 2022-02-15 PROCEDURE — 160035 HCHG PACU - 1ST 60 MINS PHASE I: Performed by: ORTHOPAEDIC SURGERY

## 2022-02-15 PROCEDURE — 64445 NJX AA&/STRD SCIATIC NRV IMG: CPT | Performed by: ORTHOPAEDIC SURGERY

## 2022-02-15 PROCEDURE — 160046 HCHG PACU - 1ST 60 MINS PHASE II: Performed by: ORTHOPAEDIC SURGERY

## 2022-02-15 PROCEDURE — 160009 HCHG ANES TIME/MIN: Performed by: ORTHOPAEDIC SURGERY

## 2022-02-15 PROCEDURE — 160002 HCHG RECOVERY MINUTES (STAT): Performed by: ORTHOPAEDIC SURGERY

## 2022-02-15 PROCEDURE — 700105 HCHG RX REV CODE 258: Performed by: ORTHOPAEDIC SURGERY

## 2022-02-15 PROCEDURE — 501838 HCHG SUTURE GENERAL: Performed by: ORTHOPAEDIC SURGERY

## 2022-02-15 PROCEDURE — A6454 SELF-ADHER BAND W>=3" <5"/YD: HCPCS | Performed by: ORTHOPAEDIC SURGERY

## 2022-02-15 PROCEDURE — 700111 HCHG RX REV CODE 636 W/ 250 OVERRIDE (IP): Performed by: STUDENT IN AN ORGANIZED HEALTH CARE EDUCATION/TRAINING PROGRAM

## 2022-02-15 PROCEDURE — 27698 REPAIR OF ANKLE LIGAMENT: CPT | Mod: 29SX | Performed by: NURSE PRACTITIONER

## 2022-02-15 PROCEDURE — 29898 ANKLE ARTHROSCOPY/SURGERY: CPT | Mod: 29SX | Performed by: NURSE PRACTITIONER

## 2022-02-15 PROCEDURE — 27698 REPAIR OF ANKLE LIGAMENT: CPT | Performed by: ORTHOPAEDIC SURGERY

## 2022-02-15 PROCEDURE — C1713 ANCHOR/SCREW BN/BN,TIS/BN: HCPCS | Performed by: ORTHOPAEDIC SURGERY

## 2022-02-15 PROCEDURE — 27658 REPAIR OF LEG TENDON EACH: CPT | Performed by: ORTHOPAEDIC SURGERY

## 2022-02-15 PROCEDURE — A9270 NON-COVERED ITEM OR SERVICE: HCPCS | Performed by: STUDENT IN AN ORGANIZED HEALTH CARE EDUCATION/TRAINING PROGRAM

## 2022-02-15 PROCEDURE — 27720 REPAIR OF TIBIA: CPT | Mod: RT | Performed by: ORTHOPAEDIC SURGERY

## 2022-02-15 PROCEDURE — 27720 REPAIR OF TIBIA: CPT | Mod: 29SX,RT | Performed by: NURSE PRACTITIONER

## 2022-02-15 PROCEDURE — 27658 REPAIR OF LEG TENDON EACH: CPT | Mod: 29SX | Performed by: NURSE PRACTITIONER

## 2022-02-15 PROCEDURE — 160029 HCHG SURGERY MINUTES - 1ST 30 MINS LEVEL 4: Performed by: ORTHOPAEDIC SURGERY

## 2022-02-15 PROCEDURE — 502000 HCHG MISC OR IMPLANTS RC 0278: Performed by: ORTHOPAEDIC SURGERY

## 2022-02-15 PROCEDURE — A6223 GAUZE >16<=48 NO W/SAL W/O B: HCPCS | Performed by: ORTHOPAEDIC SURGERY

## 2022-02-15 PROCEDURE — 29898 ANKLE ARTHROSCOPY/SURGERY: CPT | Performed by: ORTHOPAEDIC SURGERY

## 2022-02-15 PROCEDURE — 500881 HCHG PACK, EXTREMITY: Performed by: ORTHOPAEDIC SURGERY

## 2022-02-15 PROCEDURE — 160048 HCHG OR STATISTICAL LEVEL 1-5: Performed by: ORTHOPAEDIC SURGERY

## 2022-02-15 PROCEDURE — 81025 URINE PREGNANCY TEST: CPT

## 2022-02-15 PROCEDURE — 160025 RECOVERY II MINUTES (STATS): Performed by: ORTHOPAEDIC SURGERY

## 2022-02-15 PROCEDURE — 160041 HCHG SURGERY MINUTES - EA ADDL 1 MIN LEVEL 4: Performed by: ORTHOPAEDIC SURGERY

## 2022-02-15 PROCEDURE — 700102 HCHG RX REV CODE 250 W/ 637 OVERRIDE(OP): Performed by: STUDENT IN AN ORGANIZED HEALTH CARE EDUCATION/TRAINING PROGRAM

## 2022-02-15 PROCEDURE — A6222 GAUZE <=16 IN NO W/SAL W/O B: HCPCS | Performed by: ORTHOPAEDIC SURGERY

## 2022-02-15 PROCEDURE — 700101 HCHG RX REV CODE 250: Performed by: STUDENT IN AN ORGANIZED HEALTH CARE EDUCATION/TRAINING PROGRAM

## 2022-02-15 DEVICE — IMPLANTABLE DEVICE: Type: IMPLANTABLE DEVICE | Site: ANKLE | Status: FUNCTIONAL

## 2022-02-15 RX ORDER — HYDROMORPHONE HYDROCHLORIDE 2 MG/ML
INJECTION, SOLUTION INTRAMUSCULAR; INTRAVENOUS; SUBCUTANEOUS PRN
Status: DISCONTINUED | OUTPATIENT
Start: 2022-02-15 | End: 2022-02-15 | Stop reason: SURG

## 2022-02-15 RX ORDER — ESMOLOL HYDROCHLORIDE 10 MG/ML
INJECTION INTRAVENOUS PRN
Status: DISCONTINUED | OUTPATIENT
Start: 2022-02-15 | End: 2022-02-15 | Stop reason: SURG

## 2022-02-15 RX ORDER — CEFAZOLIN SODIUM 1 G/3ML
INJECTION, POWDER, FOR SOLUTION INTRAMUSCULAR; INTRAVENOUS PRN
Status: DISCONTINUED | OUTPATIENT
Start: 2022-02-15 | End: 2022-02-15 | Stop reason: SURG

## 2022-02-15 RX ORDER — MEPERIDINE HYDROCHLORIDE 25 MG/ML
12.5 INJECTION INTRAMUSCULAR; INTRAVENOUS; SUBCUTANEOUS
Status: DISCONTINUED | OUTPATIENT
Start: 2022-02-15 | End: 2022-02-15 | Stop reason: HOSPADM

## 2022-02-15 RX ORDER — HYDROMORPHONE HYDROCHLORIDE 1 MG/ML
0.4 INJECTION, SOLUTION INTRAMUSCULAR; INTRAVENOUS; SUBCUTANEOUS
Status: DISCONTINUED | OUTPATIENT
Start: 2022-02-15 | End: 2022-02-15 | Stop reason: HOSPADM

## 2022-02-15 RX ORDER — SODIUM CHLORIDE, SODIUM LACTATE, POTASSIUM CHLORIDE, CALCIUM CHLORIDE 600; 310; 30; 20 MG/100ML; MG/100ML; MG/100ML; MG/100ML
INJECTION, SOLUTION INTRAVENOUS CONTINUOUS
Status: DISCONTINUED | OUTPATIENT
Start: 2022-02-15 | End: 2022-02-15 | Stop reason: HOSPADM

## 2022-02-15 RX ORDER — HYDRALAZINE HYDROCHLORIDE 20 MG/ML
5 INJECTION INTRAMUSCULAR; INTRAVENOUS
Status: DISCONTINUED | OUTPATIENT
Start: 2022-02-15 | End: 2022-02-15 | Stop reason: HOSPADM

## 2022-02-15 RX ORDER — HYDROMORPHONE HYDROCHLORIDE 1 MG/ML
0.1 INJECTION, SOLUTION INTRAMUSCULAR; INTRAVENOUS; SUBCUTANEOUS
Status: DISCONTINUED | OUTPATIENT
Start: 2022-02-15 | End: 2022-02-15 | Stop reason: HOSPADM

## 2022-02-15 RX ORDER — LABETALOL HYDROCHLORIDE 5 MG/ML
5 INJECTION, SOLUTION INTRAVENOUS
Status: DISCONTINUED | OUTPATIENT
Start: 2022-02-15 | End: 2022-02-15 | Stop reason: HOSPADM

## 2022-02-15 RX ORDER — HALOPERIDOL 5 MG/ML
1 INJECTION INTRAMUSCULAR
Status: DISCONTINUED | OUTPATIENT
Start: 2022-02-15 | End: 2022-02-15 | Stop reason: HOSPADM

## 2022-02-15 RX ORDER — DIPHENHYDRAMINE HYDROCHLORIDE 50 MG/ML
12.5 INJECTION INTRAMUSCULAR; INTRAVENOUS
Status: DISCONTINUED | OUTPATIENT
Start: 2022-02-15 | End: 2022-02-15 | Stop reason: HOSPADM

## 2022-02-15 RX ORDER — DEXAMETHASONE SODIUM PHOSPHATE 4 MG/ML
INJECTION, SOLUTION INTRA-ARTICULAR; INTRALESIONAL; INTRAMUSCULAR; INTRAVENOUS; SOFT TISSUE PRN
Status: DISCONTINUED | OUTPATIENT
Start: 2022-02-15 | End: 2022-02-15 | Stop reason: SURG

## 2022-02-15 RX ORDER — METOPROLOL TARTRATE 1 MG/ML
1 INJECTION, SOLUTION INTRAVENOUS
Status: DISCONTINUED | OUTPATIENT
Start: 2022-02-15 | End: 2022-02-15 | Stop reason: HOSPADM

## 2022-02-15 RX ORDER — ROPIVACAINE HYDROCHLORIDE 5 MG/ML
INJECTION, SOLUTION EPIDURAL; INFILTRATION; PERINEURAL
Status: COMPLETED | OUTPATIENT
Start: 2022-02-15 | End: 2022-02-15

## 2022-02-15 RX ORDER — HYDROMORPHONE HYDROCHLORIDE 1 MG/ML
0.2 INJECTION, SOLUTION INTRAMUSCULAR; INTRAVENOUS; SUBCUTANEOUS
Status: DISCONTINUED | OUTPATIENT
Start: 2022-02-15 | End: 2022-02-15 | Stop reason: HOSPADM

## 2022-02-15 RX ORDER — ONDANSETRON 2 MG/ML
INJECTION INTRAMUSCULAR; INTRAVENOUS PRN
Status: DISCONTINUED | OUTPATIENT
Start: 2022-02-15 | End: 2022-02-15 | Stop reason: SURG

## 2022-02-15 RX ORDER — MIDAZOLAM HYDROCHLORIDE 1 MG/ML
INJECTION INTRAMUSCULAR; INTRAVENOUS PRN
Status: DISCONTINUED | OUTPATIENT
Start: 2022-02-15 | End: 2022-02-15 | Stop reason: SURG

## 2022-02-15 RX ORDER — OXYCODONE HCL 5 MG/5 ML
5 SOLUTION, ORAL ORAL
Status: COMPLETED | OUTPATIENT
Start: 2022-02-15 | End: 2022-02-15

## 2022-02-15 RX ORDER — OXYCODONE HCL 5 MG/5 ML
10 SOLUTION, ORAL ORAL
Status: COMPLETED | OUTPATIENT
Start: 2022-02-15 | End: 2022-02-15

## 2022-02-15 RX ORDER — ONDANSETRON 2 MG/ML
4 INJECTION INTRAMUSCULAR; INTRAVENOUS
Status: DISCONTINUED | OUTPATIENT
Start: 2022-02-15 | End: 2022-02-15 | Stop reason: HOSPADM

## 2022-02-15 RX ADMIN — ONDANSETRON 4 MG: 2 INJECTION INTRAMUSCULAR; INTRAVENOUS at 13:28

## 2022-02-15 RX ADMIN — DEXAMETHASONE SODIUM PHOSPHATE 4 MG: 4 INJECTION, SOLUTION INTRA-ARTICULAR; INTRALESIONAL; INTRAMUSCULAR; INTRAVENOUS; SOFT TISSUE at 13:00

## 2022-02-15 RX ADMIN — PROPOFOL 200 MG: 10 INJECTION, EMULSION INTRAVENOUS at 12:54

## 2022-02-15 RX ADMIN — FENTANYL CITRATE 100 MCG: 50 INJECTION, SOLUTION INTRAMUSCULAR; INTRAVENOUS at 12:53

## 2022-02-15 RX ADMIN — SODIUM CHLORIDE, POTASSIUM CHLORIDE, SODIUM LACTATE AND CALCIUM CHLORIDE: 600; 310; 30; 20 INJECTION, SOLUTION INTRAVENOUS at 12:42

## 2022-02-15 RX ADMIN — OXYCODONE HYDROCHLORIDE 5 MG: 5 SOLUTION ORAL at 13:52

## 2022-02-15 RX ADMIN — HYDROMORPHONE HYDROCHLORIDE 0.8 MCG: 2 INJECTION INTRAMUSCULAR; INTRAVENOUS; SUBCUTANEOUS at 13:02

## 2022-02-15 RX ADMIN — MIDAZOLAM HYDROCHLORIDE 2 MG: 1 INJECTION, SOLUTION INTRAMUSCULAR; INTRAVENOUS at 12:48

## 2022-02-15 RX ADMIN — ONDANSETRON 4 MG: 2 INJECTION INTRAMUSCULAR; INTRAVENOUS at 13:00

## 2022-02-15 RX ADMIN — PROPOFOL 200 MG: 10 INJECTION, EMULSION INTRAVENOUS at 13:02

## 2022-02-15 RX ADMIN — ROPIVACAINE HYDROCHLORIDE 30 ML: 5 INJECTION, SOLUTION EPIDURAL; INFILTRATION; PERINEURAL at 12:45

## 2022-02-15 RX ADMIN — ESMOLOL HYDROCHLORIDE 30 MG: 100 INJECTION, SOLUTION INTRAVENOUS at 13:06

## 2022-02-15 RX ADMIN — CEFAZOLIN 3 G: 330 INJECTION, POWDER, FOR SOLUTION INTRAMUSCULAR; INTRAVENOUS at 12:55

## 2022-02-15 ASSESSMENT — PAIN DESCRIPTION - PAIN TYPE
TYPE: SURGICAL PAIN

## 2022-02-15 ASSESSMENT — PAIN SCALES - GENERAL: PAIN_LEVEL: 4

## 2022-02-15 ASSESSMENT — FIBROSIS 4 INDEX: FIB4 SCORE: 0.45

## 2022-02-15 NOTE — ANESTHESIA TIME REPORT
Anesthesia Start and Stop Event Times     Date Time Event    2/15/2022 1253 Ready for Procedure     1253 Anesthesia Start     1345 Anesthesia Stop        Responsible Staff  02/15/22    Name Role Begin End    William Velazquez M.D. 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

## 2022-02-15 NOTE — DISCHARGE INSTRUCTIONS
ACTIVITY: Rest and take it easy for the first 24 hours.  A responsible adult is recommended to remain with you during that time.  It is normal to feel sleepy.  We encourage you to not do anything that requires balance, judgment or coordination.    MILD FLU-LIKE SYMPTOMS ARE NORMAL. YOU MAY EXPERIENCE GENERALIZED MUSCLE ACHES, THROAT IRRITATION, HEADACHE AND/OR SOME NAUSEA.    FOR 24 HOURS DO NOT:  Drive, operate machinery or run household appliances.  Drink beer or alcoholic beverages.   Make important decisions or sign legal documents.    SPECIAL INSTRUCTIONS:  Non Weight Bearing Right Lower Extremity  Ice and elevate  Stay ahead of pain  Keep splint clean and dry  Restart Eliquis this evening    DIET: To avoid nausea, slowly advance diet as tolerated, avoiding spicy or greasy foods for the first day.  Add more substantial food to your diet according to your physician's instructions.  Babies can be fed formula or breast milk as soon as they are hungry.  INCREASE FLUIDS AND FIBER TO AVOID CONSTIPATION.    SURGICAL DRESSING/BATHING: Keep dressing clean, dry, and intact.    FOLLOW-UP APPOINTMENT:  A follow-up appointment should be arranged with your doctor in 1-2 weeks; call to schedule.    You should CALL YOUR PHYSICIAN if you develop:  Fever greater than 101 degrees F.  Pain not relieved by medication, or persistent nausea or vomiting.  Excessive bleeding (blood soaking through dressing) or unexpected drainage from the wound.  Extreme redness or swelling around the incision site, drainage of pus or foul smelling drainage.  Inability to urinate or empty your bladder within 8 hours.  Problems with breathing or chest pain.    You should call 911 if you develop problems with breathing or chest pain.  If you are unable to contact your doctor or surgical center, you should go to the nearest emergency room or urgent care center.  Physician's telephone #: [phone]    If any questions arise, call your doctor.  If your  doctor is not available, please feel free to call the Surgical Center at [phone].     A registered nurse may call you a few days after your surgery to see how you are doing after your procedure.    MEDICATIONS: Resume taking daily medication.  Take prescribed pain medication with food.  If no medication is prescribed, you may take non-aspirin pain medication if needed.  PAIN MEDICATION CAN BE VERY CONSTIPATING.  Take a stool softener or laxative such as senokot, pericolace, or milk of magnesia if needed.    Prescription given for Oxycodone, Gabapentin.  Last pain medication given at 1:52pm.    If your physician has prescribed pain medication that includes Acetaminophen (Tylenol), do not take additional Acetaminophen (Tylenol) while taking the prescribed medication.    Depression / Suicide Risk    As you are discharged from this [facility] facility, it is important to learn how to keep safe from harming yourself.    Recognize the warning signs:  · Abrupt changes in personality, positive or negative- including increase in energy   · Giving away possessions  · Change in eating patterns- significant weight changes-  positive or negative  · Change in sleeping patterns- unable to sleep or sleeping all the time   · Unwillingness or inability to communicate  · Depression  · Unusual sadness, discouragement and loneliness  · Talk of wanting to die  · Neglect of personal appearance   · Rebelliousness- reckless behavior  · Withdrawal from people/activities they love  · Confusion- inability to concentrate     If you or a loved one observes any of these behaviors or has concerns about self-harm, here's what you can do:  · Talk about it- your feelings and reasons for harming yourself  · Remove any means that you might use to hurt yourself (examples: pills, rope, extension cords, firearm)  · Get professional help from the community (Mental Health, Substance Abuse, psychological counseling)  · Do not be alone:Call your  Safe Contact- someone whom you trust who will be there for you.  · Call your local CRISIS HOTLINE 653-3455 or 702-739-8477  · Call your local Children's Mobile Crisis Response Team [place] (169) 894-6909 or www.[employer]  · Call the toll free National Suicide Prevention Hotlines   · National Suicide Prevention Lifeline 966-355-EQCK (6808)  · National Link To Media Line Network 800-SUICIDE (389-3098)

## 2022-02-15 NOTE — H&P
Surgery Orthopedic History & Physical Note    Date  2/15/2022    Primary Care Physician  JET Jade      Pre-Op Diagnosis Codes:     * Ankle instability, right [M25.371]    HPI  Subjective      Patient ID:  Kathi Guan is a 36 y.o. female.     Chief Complaint:  Pain of the Right Ankle     Last Surgery: No surgery found on No surgery found     HPI Kathi is a pleasant 36-year-old female who rolled her ankle at work in July of this year.  She was placed in a boot and made nonweightbearing for 2-1/2 months.  She did develop a pulmonary embolus for which she has now on Eliquis.  She is tolerating Eliquis fine.  She is having persistent ankle pain and feelings of instability despite bracing and physical therapy currently, after it.  Of nonweightbearing previously.  Her case was delayed by COVID, she is now amenable to surgical repair.     Review of Systems           Objective      Ortho Exam  Alert and oriented no apparent distress.  She stands in neutral alignment.  She has a positive anterior drawer and talar tilt.  She has pain over her peroneal tendons at the turn, has pain over her ATFL, has a positive anterior drawer talar tilt.  Mild discomfort over her deltoid.  Neurovascularly intact throughout.     Last Imaging Result(s):   DX-ANKLE 3+ VIEWS RIGHT  New right ankle films.  Normal standing alignment.  Well-maintained   tibiotalar and subtalar joint spaces.  Fibula avulsion fracture appears to   be impinging with lateral process of the talus, nonunited.  MRI shows nonhealed avulsion of the distal fibula, avulsion of the ATFL and CFL, impingement of the distal fibula with the lateral process.  Fluid around both peroneal tendons with likely tearing of the peroneus brevis.  Fluid within the deltoid ligament without tearing.           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     Pleasant 36-year-old female now 4 months out from an injury to her right ankle sustained at work.  Her post injury course has been complicated by pulmonary embolus for which she is now on Eliquis.  She unfortunately has gone on to chronic instability and a nonunion.  She is indicated for arthroscopic debridement of her right ankle, open treatment of the distal fibula nonunion, lateral ligament repair, peroneal debridement and repair, possible deltoid repair.  She be nonweightbearing for 10 days followed by weightbearing progression in a brace.  I like her to try different physical therapy group postoperatively.  MMI 6 months, sitdown duty at work.    Past Medical History:   Diagnosis Date   • Asthma    • Blood clotting disorder (HCC) 09/2021    lung   • Psychiatric problem 12/23/2021    anxiety       Past Surgical History:   Procedure Laterality Date   • OTHER  2007    lasik eye surgery   • OTHER  2005    wisdom teeth removed       Current Facility-Administered Medications   Medication Dose Route Frequency Provider Last Rate Last Admin   • lidocaine (XYLOCAINE) 1 % injection 0.5 mL  0.5 mL Intradermal Once PRN William Abdullahi M.D.       • lactated ringers infusion   Intravenous Continuous William Abdullahi M.D.           Social History     Socioeconomic History   • Marital status:      Spouse name: Not on file   • Number of children: Not on file   • Years of education: Not on file   • Highest education level: Not on file   Occupational History   • Not on file   Tobacco Use   • Smoking status: Never Smoker   • Smokeless tobacco: Never Used   Vaping Use   • Vaping Use: Never used   Substance and Sexual Activity   • Alcohol use: Yes     Comment: 1-2 per week   • Drug use: Not Currently     Types: Marijuana, Inhaled     Comment: occasionally   • Sexual activity: Yes     Partners:  Male     Birth control/protection: Male Sterilization   Other Topics Concern   • Not on file   Social History Narrative   • Not on file     Social Determinants of Health     Financial Resource Strain:    • Difficulty of Paying Living Expenses: Not on file   Food Insecurity:    • Worried About Running Out of Food in the Last Year: Not on file   • Ran Out of Food in the Last Year: Not on file   Transportation Needs:    • Lack of Transportation (Medical): Not on file   • Lack of Transportation (Non-Medical): Not on file   Physical Activity:    • Days of Exercise per Week: Not on file   • Minutes of Exercise per Session: Not on file   Stress:    • Feeling of Stress : Not on file   Social Connections:    • Frequency of Communication with Friends and Family: Not on file   • Frequency of Social Gatherings with Friends and Family: Not on file   • Attends Religion Services: Not on file   • Active Member of Clubs or Organizations: Not on file   • Attends Club or Organization Meetings: Not on file   • Marital Status: Not on file   Intimate Partner Violence:    • Fear of Current or Ex-Partner: Not on file   • Emotionally Abused: Not on file   • Physically Abused: Not on file   • Sexually Abused: Not on file   Housing Stability:    • Unable to Pay for Housing in the Last Year: Not on file   • Number of Places Lived in the Last Year: Not on file   • Unstable Housing in the Last Year: Not on file       Family History   Problem Relation Age of Onset   • No Known Problems Mother    • Hyperlipidemia Father    • Hypertension Father    • No Known Problems Sister    • Cancer Paternal Aunt         breast cancer   • Diabetes Paternal Uncle    • Cancer Maternal Grandmother         bone   • Asthma Maternal Grandmother    • Arthritis Maternal Grandmother    • Dementia Paternal Grandmother    • Hypertension Paternal Grandmother    • Diabetes Paternal Grandmother    • Hyperlipidemia Paternal Grandmother    • Stroke Paternal Grandmother    •  Heart Disease Paternal Grandfather         MI x 4   • No Known Problems Half-brother    • No Known Problems Son        Allergies  Amoxicillin and Penicillins    Review of Systems  Negative    Physical Exam    Vital Signs  Blood Pressure: 118/86   Temperature: 36.2 °C (97.2 °F)   Pulse: 89   Respiration: 16   Pulse Oximetry: 99 %       Labs:                    Radiology:  DX-ANKLE 3+ VIEWS RIGHT    (Results Pending)   DX-PORTABLE FLUORO > 1 HOUR    (Results Pending)         Assessment/Plan:  Pre-Op Diagnosis Codes:     * Ankle instability, right [M25.371]  Procedure(s):  ANKLE ARTHROSCOPY, OPEN TREATMENT FIBULA NONUNION, LATERAL LIGAMENT REPAIR, PERONEAL REPAIR, POSSIBLE DELTOID REPAIR  RECONSTRUCTION, LIGAMENT, ANKLE  RECONSTRUCTION, LIGAMENT  REPAIR, TENDON, FLEXOR

## 2022-02-15 NOTE — OP REPORT
DATE OF SERVICE:  02/15/2022     PREOPERATIVE DIAGNOSES:  1.  Right ankle instability.  2.  Right peroneus brevis tear.  3.  Right fibula nonunion.  4.  Right ankle pain.     POSTOPERATIVE DIAGNOSES:  1.  Right ankle instability.  2.  Right peroneus brevis tear.  3.  Right fibula nonunion.  4.  Right ankle pain.     PROCEDURES:   1.  Right ankle arthroscopy with extensive debridement.  2.  Open treatment right fibula nonunion.  3.  Right ATFL repair.  4.  Right CFL repair.  5.  Right peroneus brevis repair.     SURGEON:  William Abdullahi MD     ASSISTANT:  VALENTIN Luna     ANESTHESIA:  General with peripheral nerve block requested by me for   postoperative pain control.     ESTIMATED BLOOD LOSS:  10 mL.     TOURNIQUET TIME:  22 minutes at 250 mmHg.     IMPLANTS:  Arthrex FiberTak anchor x1.     COMPLICATIONS:  None.     OUTCOME:  PACU in stable condition.     HISTORY OF PRESENT ILLNESS:  This is a pleasant 36-year-old female with a   work-related injury to her right ankle.  She unfortunately has failed   appropriate conservative management and was indicated for the above-stated   procedure.  She was greeted in the preoperative holding area and identified by   name and medical record number.  The right lower extremity was marked.  Risks   of procedure including bleeding, infection, pain, continuation of symptoms,   neurovascular damage, need for more surgery were discussed.  She provided   written consent.     DESCRIPTION OF PROCEDURE:  She was taken to the operating room and placed on   table in supine position.  Preoperative antibiotics were administered.    General anesthesia was induced.  Nonsterile tourniquet was placed on the right   thigh.  Right lower extremity prepped and draped in the usual sterile   fashion.  Operative pause was taken where all present were in agreement with   patient identification, laterality, and procedure to be performed.  The limb   was elevated for 5 minutes, tourniquet  raised to 250 mmHg.     The joint was insufflated with 10 mL sterile normal saline.  A medial portal   was made at the level of joint just medial to the tibialis anterior tendon.    We debrided a significant amount of arthrofibrotic tissue from the anterior   distal tibia, the dorsal talar neck medial and lateral gutters.  We removed   small osteophytes from the medial malleolus.  We used a 15 blade to transect   Toughkenamon's ligament, which was then removed with the shaver.  We noted lateral   instability; however, she seemed stable to external rotation.  The syndesmosis   appeared stable.  We evaluated the entirety of the joint surface and did not   note any full thickness defects.  Arthroscopic instruments were withdrawn.     I made a 4 cm curvilinear incision along the turn of the peroneal tendons   curved anteriorly at the distal most point.  Blunt dissection carried   posteriorly to the peroneal tendon sheath, which was incised in line with the   incision.  Immediately seen was extensive tenosynovectomy tissue around both   longus and brevis.  We circumferentially debrided each and performed a   traction tenolysis.  There was a 3 cm degenerative tear of peroneus brevis.    We sharply excised the degenerative and torn tissue to smooth striated tendon.    We then dissected anteriorly through this incision to the ATFL and CFL.  We   released them from their origins on the fibula.  We dissected around the   nonunited fragment of the fibula and removed it in its entirety.  We used a   rongeur to roughen the distal fibula.  We placed a FiberTak anchor 1 cm from   the tip.  One limb of suture was passed through the CFL in modified   Peter-Thom fashion, the other through the ATFL in modified Peter-Thom   fashion.  CFL sutures tied in eversion and ATFL sutures in relaxed plantar   flexion and posterior drawer.  These sutures were then brought back through   the inferior extensor retinaculum and the fibular periosteum  and again tied.    At this point, anterior drawer and talar tilt were quite stable and I   maintained it in an anatomic reduction of the ankle mortise.  I felt that the   deltoid was stable and did not need to be repaired separately.  The tourniquet   was let down.  Hemostasis was achieved.  Incision copiously irrigated.    Subcutaneous layer was closed with 3-0 Monocryl in buried interrupted fashion.    Skin closed with 3-0 nylon in horizontal mattress fashion.  Adaptic gauze   and a well-padded posterior splint with stirrup were placed.  She was awakened   and extubated in stable condition.     POSTOPERATIVE COURSE:  She will be discharged home today assuming adequate   pain control, mobilization, nonweightbearing right lower extremity.  She may   restart her Eliquis tonight.  I will see her in 10-14 days for suture removal   and wound check.  We will begin a weightbearing progression in a brace at that   point.        ______________________________  MARTHA BURR MD    SRW/ASS    DD:  02/15/2022 14:05  DT:  02/15/2022 15:29    Job#:  [id number]

## 2022-02-15 NOTE — OR NURSING
1440: Report received from KEIRA Marie. Patient to Phase II 32 pending transport.    1448: Patient to Phase II Rm 32. Right ankle dressing CDI. Vitals taken upon arrival. Plan of care discussed with patient.    1455: Patient and family updated regarding Plan of care.    1500: Patient provided with water. Patient tolerating PO intake.  Patient belongings returned to patient at bedside.    1530: Pt discharged home. Discharge instructions given to pt prior to leaving unit including when to see PCP, and new medications if applicable. PIV removed. All questions answered. Verbalized understanding. All belongings with pt when leaving unit via wheelchair with RN.

## 2022-02-15 NOTE — ANESTHESIA POSTPROCEDURE EVALUATION
Patient: Kathi Guan    Procedure Summary     Date: 02/15/22 Room / Location: [place] [address] / SURGERY [facility]    Anesthesia Start: 1253 Anesthesia Stop: 1345    Procedures:       ANKLE ARTHROSCOPY, OPEN TREATMENT FIBULA NONUNION, LATERAL LIGAMENT REPAIR, PERONEAL REPAIR, POSSIBLE DELTOID REPAIR (Right Ankle)      RECONSTRUCTION, LIGAMENT, ANKLE (Right Ankle)      RECONSTRUCTION, LIGAMENT (Right Ankle)      REPAIR, TENDON, FLEXOR (Right Ankle) Diagnosis:       Ankle instability, right      (Ankle instability, right [M25.371])    Surgeons: William Abdullahi M.D. Responsible Provider: William Velazquez M.D.    Anesthesia Type: general, peripheral nerve block ASA Status: 3          Final Anesthesia Type: general, peripheral nerve block  Last vitals  BP   Blood Pressure: 133/69    Temp   36.3 °C (97.4 °F)    Pulse   83   Resp   16    SpO2   96 %      Anesthesia Post Evaluation    Patient location during evaluation: PACU  Patient participation: complete - patient participated  Level of consciousness: awake and alert  Pain score: 4    Airway patency: patent  Anesthetic complications: no  Cardiovascular status: hemodynamically stable  Respiratory status: acceptable  Hydration status: euvolemic    PONV: none          No complications documented.     Nurse Pain Score: 4 (NPRS)

## 2022-02-15 NOTE — LETTER
February 4, 2022    Patient Name: Kathi Guan  Surgeon Name: William Abdullahi M.D.  Surgery Facility: [facility] [place] ([address])  Surgery Date: 2/15/2022    The time of your surgery is not final and may change up to and until the day of your surgery. You will be contacted 24-48 hours prior to your surgery date with your check-in and surgery time.    If you will not be at one of the below numbers please call/text the surgery scheduler at [phone]  Preferred Phone: [phone]    BEFORE YOUR SURGERY  Pre Registration and/or Lab Work must be done within and no earlier than 28 days prior to your surgery date. Please call [facility] [place]note 2] at [phone] for an appointment as soon as possible.     COVID test required 4-7 days prior to surgery, failure to do so can result in a cancellation.    The following locations offer COVID testing:    Approved facilities for COVID testing, if scheduled at [facility]:  · [facility] from 7:30am-3:30pm at [address], [place]  · [facility] [phone] (Please call for an appointment)  · Your local pharmacy    Not scheduled at [facility] contact the scheduled facility for approved testing facilities.    Pre op Appointment:    Instructions: Bring a list of all medications you are taking including the dosing and frequency.    - Your Post-Op Packet and necessary DME will be available for pick-up the day prior to surgery. Please let your provider's MA know at [phone] if you want to pick-up your prescriptions prior to surgery, hand written narcotics must be filled in [place]. If you do not  the prescription prior to surgery you will receive it at surgery.    Please refrain from smoking any substance after midnight prior to surgery. Smoking may interfere with the anesthetic and frequently produces nausea during the recovery period.    Continue taking all lifesaving medications. Including the  morning of your surgery with small sip of water.    Please read the MEDICATION INSTRUCTIONS below completely.    DAY OF YOUR SURGERY  Nothing to eat or drink after midnight     Please arrive at the hospital/surgery center at the check-in time provided.     An adult will need to bring you and take you home after your surgery.     AFTER YOUR SURGERY  Post op Appointment:   Date: 02/28/22   Time: 01:15PM   With: William Abdullahi M.D.   Location: [address] [place] [id number]    TIME OFF WORK  FMLA or Disability forms can be faxed directly to: [phone] or you may drop them off at [address] [facility] [place] [address]. Our office charges a $35.00 fee per form. Forms will be completed within 10 business days of drop off and payment received. For the status of your forms you may contact our disability office directly [phone].    MEDICATION INSTRUCTIONS  The following medications should be stopped a minimum of 10 days prior to surgery:  All over the counter, Supplements & Herbal medications    Anorectics: Phentermine (Adipex-P, Lomaira and Suprenza), Phentermine-topiramate (Qsymia), Bupropion-naltrexone (Contrave)    Opiod Partial Agonists/Opioid Antagonists: Buprenorphine (Subocone, Belbuca, Butrans, Probuphine Implant, Sublocade), Naltrexone (ReVia, Vivitrol), Naloxone    Amphetamines: Dextroamphetamine/Amphetamine (Adderall, Mydayis), Methylphenidate Hydrochloride (Concerta, Metadate, Methylin, Ritalin)    The following medications should be stopped 5 days prior to surgery:  Blood Thinners: Any Aspirin, Aspirin products, anti-inflammatories such as ibuprofen and any blood thinners such as Coumadin and Plavix. Please consult your prescribing physician if you are on life saving blood thinners, in regards to when to stop medications prior to surgery.     The following medications should be stopped a minimum of 3 days prior to surgery:  PDE-5 inhibitors: Sildenafil (Viagra), Tadalafil (Cialis), Vardenafil  (Levitra), Avanafil (Stendra)    MAO Inhibitors: Rasagiline (Azilect), Selegiline (Eldepryl, Emsam, Selapar), Isocarboxazid (Marplan), Phenelzine (Nardil)

## 2022-02-15 NOTE — ANESTHESIA PROCEDURE NOTES
Airway    Date/Time: 2/15/2022 12:55 PM  Performed by: William Velazquez M.D.  Authorized by: William Velazquez M.D.     Location:  OR  Urgency:  Elective  Indications for Airway Management:  Anesthesia      Spontaneous Ventilation: absent    Sedation Level:  Deep  Preoxygenated: Yes    Mask Difficulty Assessment:  0 - not attempted  Final Airway Type:  Supraglottic airway  Final Supraglottic Airway:  Standard LMA    SGA Size:  3  Number of Attempts at Approach:  1  Ventilation Between Attempts:  None  Number of Other Approaches Attempted:  0

## 2022-02-15 NOTE — ANESTHESIA PREPROCEDURE EVALUATION
Case: [id number] Date/Time: 02/15/22 5825    Procedures:       ANKLE ARTHROSCOPY, OPEN TREATMENT FIBULA NONUNION, LATERAL LIGAMENT REPAIR, PERONEAL REPAIR, POSSIBLE DELTOID REPAIR (Right )      RECONSTRUCTION, LIGAMENT, ANKLE      RECONSTRUCTION, LIGAMENT      REPAIR, TENDON, FLEXOR    Diagnosis: Ankle instability, right [M25.371]    Pre-op diagnosis: Ankle instability, right [M25.371]    Location: [place] [address] / SURGERY [facility]    Surgeons: William Abdullahi M.D.          Relevant Problems   PULMONARY   (positive) Mild intermittent asthma without complication      CARDIAC   (positive) Multiple subsegmental pulmonary emboli without acute cor pulmonale (HCC)       Physical Exam    Airway   Mallampati: II  TM distance: >3 FB  Neck ROM: full       Cardiovascular - normal exam  Rhythm: regular  Rate: normal  (-) murmur     Dental - normal exam           Pulmonary - normal exam  Breath sounds clear to auscultation     Abdominal   (+) obese     Neurological - normal exam                 Anesthesia Plan    ASA 3       Plan - general and peripheral nerve block     Peripheral nerve block will be post-op pain control  Airway plan will be LMA          Induction: intravenous    Postoperative Plan: Postoperative administration of opioids is intended.    Pertinent diagnostic labs and testing reviewed    Informed Consent:    Anesthetic plan and risks discussed with patient.    Use of blood products discussed with: patient whom consented to blood products.

## 2022-02-15 NOTE — OR NURSING
1338 Patient arrived to PACU from OR.  Report from anesthesia and RN.  Patient is awake, denies pain.  Dressing in place to RLE clean, dry and soft.  1352 Patient medicated per MAR for pain.   called and updated.  1430 Patient reports tolerable pain at this time.  1435 Report to Ruslan CROCKETT.  1445 Patient transferred to Phase 2.

## 2022-02-15 NOTE — ANESTHESIA PROCEDURE NOTES
Peripheral Block    Date/Time: 2/15/2022 12:45 PM  Performed by: William Velazquez M.D.  Authorized by: William Velazquez M.D.     Start Time:  2/15/2022 12:45 PM  End Time:  2/15/2022 12:47 PM  Reason for Block: at surgeon's request and post-op pain management ONLY    patient identified, IV checked, site marked, risks and benefits discussed, surgical consent, monitors and equipment checked, pre-op evaluation and timeout performed    Patient Position:  Supine  Prep: ChloraPrep    Monitoring:  Heart rate, continuous pulse ox and cardiac monitor  Block Region:  Lower Extremity  Lower Extremity - Block Type:  SCIATIC nerve block, lateral approach    Laterality:  Right  Procedures: ultrasound guided  Image captured, interpreted and electronically stored.  Local Infiltration:  Lidocaine  Strength:  1 %  Dose:  3 ml  Block Type:  Single-shot  Needle Length:  100mm  Needle Gauge:  21 G  Needle Localization:  Ultrasound guidance  Injection Assessment:  Negative aspiration for heme, no paresthesia on injection, incremental injection and local visualized surrounding nerve on ultrasound  Evidence of intravascular injection: No     US Guided Sciatic Nerve Block   US probe placed several cm proximal to popliteal crease on posterior thigh and scanned caudad and cephalad until Sciatic Nerve (SN) identified superficial/lateral to popliteal artery.  Needle inserted lateral to probe in an in plane approach under direct visualization to a perineural position.  After negative aspiration LA injected with ease and visualized surrounding the SN.

## 2022-03-24 ENCOUNTER — PATIENT MESSAGE (OUTPATIENT)
Dept: MEDICAL GROUP | Facility: PHYSICIAN GROUP | Age: 37
End: 2022-03-24
Payer: COMMERCIAL

## 2022-03-24 DIAGNOSIS — F41.9 ANXIETY: ICD-10-CM

## 2022-03-24 RX ORDER — HYDROXYZINE HYDROCHLORIDE 25 MG/1
6.25-12.5 TABLET, FILM COATED ORAL 2 TIMES DAILY PRN
Qty: 30 TABLET | Refills: 0 | Status: CANCELLED | OUTPATIENT
Start: 2022-03-24

## 2022-03-24 RX ORDER — HYDROXYZINE HYDROCHLORIDE 25 MG/1
25 TABLET, FILM COATED ORAL 3 TIMES DAILY PRN
Qty: 30 TABLET | Refills: 2 | Status: SHIPPED | OUTPATIENT
Start: 2022-03-24

## 2022-03-24 NOTE — PATIENT COMMUNICATION
Received request via: Patient    Was the patient seen in the last year in this department? Yes    Does the patient have an active prescription (recently filled or refills available) for medication(s) requested? Patient is requesting refill of medication for anxiety

## 2022-03-24 NOTE — PROGRESS NOTES
Requested Prescriptions     Signed Prescriptions Disp Refills   • hydrOXYzine HCl (ATARAX) 25 MG Tab 30 Tablet 2     Sig: Take 1 Tablet by mouth 3 times a day as needed for Anxiety.     Authorizing Provider: MYNOR BISWAS A.P.R.N.

## 2022-04-25 ENCOUNTER — ANTICOAGULATION VISIT (OUTPATIENT)
Dept: VASCULAR LAB | Facility: MEDICAL CENTER | Age: 37
End: 2022-04-25
Attending: INTERNAL MEDICINE
Payer: COMMERCIAL

## 2022-04-25 VITALS — DIASTOLIC BLOOD PRESSURE: 90 MMHG | SYSTOLIC BLOOD PRESSURE: 122 MMHG | HEART RATE: 69 BPM

## 2022-04-25 DIAGNOSIS — I26.94 MULTIPLE SUBSEGMENTAL PULMONARY EMBOLI WITHOUT ACUTE COR PULMONALE (HCC): ICD-10-CM

## 2022-04-25 PROCEDURE — 99215 OFFICE O/P EST HI 40 MIN: CPT | Performed by: NURSE PRACTITIONER

## 2022-04-25 PROCEDURE — 99212 OFFICE O/P EST SF 10 MIN: CPT | Performed by: NURSE PRACTITIONER

## 2022-04-25 RX ORDER — ASPIRIN 81 MG/1
81 TABLET, CHEWABLE ORAL DAILY
COMMUNITY
End: 2022-08-22

## 2022-04-25 ASSESSMENT — ENCOUNTER SYMPTOMS
DIZZINESS: 0
BRUISES/BLEEDS EASILY: 0
SHORTNESS OF BREATH: 0
SEIZURES: 0
MYALGIAS: 0
LOSS OF CONSCIOUSNESS: 0
FALLS: 0
BLOOD IN STOOL: 0
CLAUDICATION: 0
HEMOPTYSIS: 0

## 2022-04-25 NOTE — PATIENT INSTRUCTIONS
- finish your current supply of Eliquis 5 mg by mouth twice daily then began taking aspirin 81 mg daily for 3-6 months   - go to the ER for shortness of breath, chest pain, pain with deep inhalation, worsening leg swelling and/or pain in calf or leg   - avoid sedentary periods  - continue complete avoidance of tobacco products  - avoid hormonal therapies including estrogen or testosterone-containing meds, or raloxifene or tamoxifene (commonly used for osteoporosis)  - elevate legs as much as possible, use compression stockings/socks if standing or sitting for long periods of time such as during travel  - if having any invasive procedures or hospitalizations, please make sure the doctor knows of your history of blood clots   - keep up with age-appropriate cancer screenings as a small % of blood clots may be caused by an underlying malignancy

## 2022-04-25 NOTE — PROGRESS NOTES
VASCULAR MEDICINE CLINIC - INITIAL VISIT (ANTICOAGULATION)  22     Kathi Guan is a 36 y.o. male who presents today for evaluation of length of therapy in regards to her chronic anticoagulation.     Subjective    HPI:  Patient referred for evaluation and management of her anticoagulation therapy.  She presented to the ER on 21 with c/o left chest pain and SOB x 3 days.   Her chest CT showed a subsegmental bilateral lower lobe pulmonary embolism.  CT also showed slight atelectasis in the left and slight pleural effusion, as well as low risk pulmonary nodules.  Steven LE venous duplex was obtained but resulted limited due to body habitus. No definite evidence of DVT noted.  She slipped and rolled her right ankle on 21 while at work. Her xray showed a closed extra-articular fx of the distal tibia.  Was NWB, using crutches, wore a boot for several weeks and had limited mobility due to this injury.  No prior hx of VTEs.  No known FH.  Denies any prior extended travel, hormone use or tobacco use.  No personal hx of cancer.  Last pap smear ~ 4 years ago.   - hx of 1 uncomplicated vaginal birth 13 years ago. Reports 3 miscarriages after her 1st pregnancy.  She was started on Xarelto 15 mg BID but reported having symptoms of syncope shortly after starting Xarelto.  She was switched to Eliquis 5 mg BID and has been adherent to taking.  Her symptoms of syncope resolved after stopping Xarelto.  She was scheduled right ankle surgery on 22 but procedure cancelled b/c she was Covid +.  Sx completed on 22 in which she underwent a rt ankle arthroscopy with extensive debridement with ligament repair. She held Eliquis 2 days prior and resume the night of surgery.  Was NWB x 10 days post op and wore a brace.  She tolerated the post-op period without any problems.  No issues with bleeding while taking Eliquis.  Had heavier menstrual cycles but no excessive bleeding.  No further SOB or CP.  Has mild  right ankle swelling.  No calf pain or swelling.  Currently in physical therapy and going to the gym to ride a stationary bike.  Works as an  at a desk but walks around frequently.  Has completed ~6 months of therapy.    Past Medical History:   Diagnosis Date   • Asthma    • Blood clotting disorder (HCC) 09/2021    lung   • Psychiatric problem 12/23/2021    anxiety        Past Surgical History:   Procedure Laterality Date   • PB ANKLE SCOPE,EXTENS DEBRIDEMNT Right 2/15/2022    Procedure: ANKLE ARTHROSCOPY, OPEN TREATMENT FIBULA NONUNION, LATERAL LIGAMENT REPAIR, PERONEAL REPAIR, POSSIBLE DELTOID REPAIR;  Surgeon: William Abdullahi M.D.;  Location: SURGERY [facility];  Service: Orthopedics   • PB REPAIR COLLAT ANKLE LIGMNT,SECONDARY Right 2/15/2022    Procedure: RECONSTRUCTION, LIGAMENT, ANKLE;  Surgeon: William Abdullahi M.D.;  Location: SURGERY [facility];  Service: Orthopedics   • PB REPAIR BOTH COLLAT ANKL LIGMT,PBIMRY Right 2/15/2022    Procedure: RECONSTRUCTION, LIGAMENT;  Surgeon: William Abdullahi M.D.;  Location: SURGERY [facility];  Service: Orthopedics   • PB REPAIR FLEX LEG TENDON,PBIM,EA Right 2/15/2022    Procedure: REPAIR, TENDON, FLEXOR;  Surgeon: William Abdullahi M.D.;  Location: SURGERY [facility];  Service: Orthopedics   • OTHER  2007    lasik eye surgery   • OTHER  2005    wisdom teeth removed        Family History   Problem Relation Age of Onset   • No Known Problems Mother    • Hyperlipidemia Father    • Hypertension Father    • No Known Problems Sister    • Cancer Paternal Aunt         breast cancer   • Diabetes Paternal Uncle    • Cancer Maternal Grandmother         bone   • Asthma Maternal Grandmother    • Arthritis Maternal Grandmother    • Dementia Paternal Grandmother    • Hypertension Paternal Grandmother    • Diabetes Paternal Grandmother    • Hyperlipidemia Paternal Grandmother    • Stroke Paternal Grandmother    • Heart Disease Paternal Grandfather         MI x 4   •  No Known Problems Half-brother    • No Known Problems Son         Social History     Tobacco Use   • Smoking status: Never Smoker   • Smokeless tobacco: Never Used   Vaping Use   • Vaping Use: Never used   Substance Use Topics   • Alcohol use: Yes     Comment: 1-2 per week   • Drug use: Not Currently     Types: Marijuana, Inhaled     Comment: occasionally        Current Outpatient Medications on File Prior to Visit   Medication Sig Dispense Refill   • aspirin (ASA) 81 MG Chew Tab chewable tablet Chew 81 mg every day. Indications: Venous Thromboembolism     • hydrOXYzine HCl (ATARAX) 25 MG Tab Take 1 Tablet by mouth 3 times a day as needed for Anxiety. 30 Tablet 2   • melatonin 5 mg Tab Take 5 mg by mouth at bedtime.     • cetirizine (ZYRTEC) 10 MG Tab Take 10 mg by mouth every day.     • albuterol 108 (90 Base) MCG/ACT Aero Soln inhalation aerosol Inhale 1-2 Puffs every four hours as needed for Shortness of Breath. 1 Each 1     No current facility-administered medications on file prior to visit.      Allergies:  Amoxicillin and Penicillins     DIET AND EXERCISE:  Weight Change: stable  Diet: common adult  Exercise: moderate regular exercise program     Review of Systems   HENT: Negative for nosebleeds.    Respiratory: Negative for hemoptysis and shortness of breath.    Cardiovascular: Positive for leg swelling. Negative for chest pain and claudication.        Mild, occas right ankle swelling   Gastrointestinal: Negative for blood in stool and melena.   Genitourinary: Negative for hematuria.   Musculoskeletal: Positive for joint pain. Negative for falls and myalgias.   Neurological: Negative for dizziness, seizures and loss of consciousness.   Endo/Heme/Allergies: Does not bruise/bleed easily.            Objective       Objective:     Vitals:    04/25/22 0801   BP: 122/90   Pulse: 69        Physical Exam  Constitutional:       Appearance: Normal appearance. She is obese.   Cardiovascular:      Rate and Rhythm: Normal  rate and regular rhythm.      Heart sounds: Normal heart sounds.   Pulmonary:      Effort: Pulmonary effort is normal.      Breath sounds: Normal breath sounds.   Abdominal:      General: Bowel sounds are normal.      Palpations: Abdomen is soft.   Musculoskeletal:      Right lower leg: Edema present.      Comments: 1+ non pitting right ankle edema. No calf pain or swelling. Velia's neg vince.   Skin:     General: Skin is warm and dry.   Neurological:      General: No focal deficit present.      Mental Status: She is alert and oriented to person, place, and time.   Psychiatric:         Mood and Affect: Mood normal.         Behavior: Behavior normal.         Thought Content: Thought content normal.         Judgment: Judgment normal.          DATA REVIEW    Lab Results   Component Value Date/Time    CHOLSTRLTOT 145 05/27/2021 07:51 AM    LDL 90 05/27/2021 07:51 AM    HDL 44 05/27/2021 07:51 AM    TRIGLYCERIDE 54 05/27/2021 07:51 AM       Lab Results   Component Value Date/Time    SODIUM 137 12/22/2021 07:49 AM    POTASSIUM 4.4 12/22/2021 07:49 AM    CHLORIDE 105 12/22/2021 07:49 AM    CO2 21 12/22/2021 07:49 AM    GLUCOSE 87 12/22/2021 07:49 AM    BUN 13 12/22/2021 07:49 AM    CREATININE 0.72 12/22/2021 07:49 AM     Lab Results   Component Value Date/Time    ALKPHOSPHAT 64 12/22/2021 07:49 AM    ASTSGOT 16 12/22/2021 07:49 AM    ALTSGPT 17 12/22/2021 07:49 AM    TBILIRUBIN 0.7 12/22/2021 07:49 AM       INR   Date Value Ref Range Status   09/29/2021 1.17 (H) 0.87 - 1.13 Final     Comment:     Reference range:  INR - Non-therapeutic Reference Range: 0.87-1.13  INR - Therapeutic Reference Range: 2.0-4.0       No results found for: CARR     9/29/21 ctpa:  IMPRESSION:     1.  There is a subsegmental bilateral lower lobe pulmonary embolism.  2.  Small left pleural effusion and subsegmental left basilar atelectasis.  3.  There is a 2 mm pulmonary nodule in the right upper lobe. In patients with a low risk for lung cancer and  without a known malignancy, guidelines by the Fleischner society (Radiology 2005; 237:395-400) suggest that nodules less than or equal to 4 mm   in diameter do not require any further follow-up. In patients with a higher risk, such as those who are smokers, a follow-up CT is recommended in one year. In patients with a known malignancy, small nodules less than or equal to 5 mm have a low but   measurable risk of representing metastases and should receive follow-up as dictated by the biology of the primary tumor.    9/29/21 rle venous duplex:  IMPRESSION:      1. Limited evaluation due to body habitus. No definite evidence of bilateral lower extremity deep venous thrombosis.    Medical Decision Making:  Today's Assessment / Status / Plan:     1. Multiple subsegmental pulmonary emboli without acute cor pulmonale (HCC)          Indication for anticoagulation: vince subsegmental PEs in the setting or ortho injury/immobility.    Anti-Platelet/Anticoagulant Discussion:  Discussed the risks and benefits of stopping anticoagulation therapy in this setting. ACCP guidelines recommend anticoagulation therapy for VTE provoked either by non surgical transient risk factors. She has completed a total of 6 months of therapy including 3 months after her ortho surgery. She has resumed exercise and has no further issues with limited mobility. We discussed the option of obtaining a hypercoag work up to r/o any predisposing genetic factors for increased VTE risk. After much discussion, she wishes to forgo hypercoag testing for now. We will have her finish her current supply of Eliquis then began taking aspirin 81 mg daily. Stressed the importance of close surveillance for s/sx of recurrent VTE which we discussed thoroughly, including when to seek emergent medical attention. Recommend she continue to avoid tobacco, hormones and a sedentary lifestyle. Asked that she let all her providers know she has a hx of VTE, connie if having any surgeries  or hospitalizations.    Anti-Coagulation Plan:  - finish your current supply of Eliquis 5 mg by mouth twice daily then began taking aspirin 81 mg daily   - go to the ER for shortness of breath, chest pain, pain with deep inhalation, worsening leg swelling and/or pain in calf or leg   - avoid sedentary periods  - continue complete avoidance of tobacco products  - avoid hormonal therapies including estrogen or testosterone-containing meds, or raloxifene or tamoxifene (commonly used for osteoporosis)  - elevate legs as much as possible, use compression stockings/socks if standing or sitting for long periods of time such as during travel  - if having any invasive procedures or hospitalizations, please make sure the doctor knows of your history of blood clots   - keep up with age-appropriate cancer screenings as a small % of blood clots may be caused by an underlying malignancy    Smoking: continue complete avoidance of all tobacco products    Physical Activity: goal is 3-4 times per week as tolerated    Instructed to follow-up with PCP for remainder of adult medical needs: yes  We will partner with other provider in the management of established vascular disease and cardiometabolic risk factors    Studies to Be Obtained: none  Labs to Be Obtained: none    Follow up in vascular PRN    Dina HANSON    Cc:  VALENTIN Huerta Dr

## 2022-05-09 ENCOUNTER — OFFICE VISIT (OUTPATIENT)
Dept: MEDICAL GROUP | Facility: PHYSICIAN GROUP | Age: 37
End: 2022-05-09
Payer: COMMERCIAL

## 2022-05-09 ENCOUNTER — PATIENT MESSAGE (OUTPATIENT)
Dept: MEDICAL GROUP | Facility: PHYSICIAN GROUP | Age: 37
End: 2022-05-09

## 2022-05-09 VITALS
DIASTOLIC BLOOD PRESSURE: 82 MMHG | TEMPERATURE: 98 F | SYSTOLIC BLOOD PRESSURE: 118 MMHG | OXYGEN SATURATION: 100 % | BODY MASS INDEX: 43.4 KG/M2 | HEIGHT: 69 IN | HEART RATE: 78 BPM | WEIGHT: 293 LBS

## 2022-05-09 DIAGNOSIS — E66.01 MORBID OBESITY WITH BMI OF 45.0-49.9, ADULT (HCC): ICD-10-CM

## 2022-05-09 DIAGNOSIS — R63.5 WEIGHT GAIN: ICD-10-CM

## 2022-05-09 PROCEDURE — 99214 OFFICE O/P EST MOD 30 MIN: CPT | Performed by: NURSE PRACTITIONER

## 2022-05-09 ASSESSMENT — PATIENT HEALTH QUESTIONNAIRE - PHQ9
SUM OF ALL RESPONSES TO PHQ QUESTIONS 1-9: 6
5. POOR APPETITE OR OVEREATING: 0 - NOT AT ALL
CLINICAL INTERPRETATION OF PHQ2 SCORE: 1

## 2022-05-09 ASSESSMENT — FIBROSIS 4 INDEX: FIB4 SCORE: 0.45

## 2022-05-10 ENCOUNTER — HOSPITAL ENCOUNTER (OUTPATIENT)
Dept: LAB | Facility: MEDICAL CENTER | Age: 37
End: 2022-05-10
Attending: NURSE PRACTITIONER
Payer: COMMERCIAL

## 2022-05-10 DIAGNOSIS — R63.5 WEIGHT GAIN: ICD-10-CM

## 2022-05-10 DIAGNOSIS — E66.01 MORBID OBESITY WITH BMI OF 45.0-49.9, ADULT (HCC): ICD-10-CM

## 2022-05-10 LAB
ALBUMIN SERPL BCP-MCNC: 4.1 G/DL (ref 3.2–4.9)
ALBUMIN/GLOB SERPL: 1.4 G/DL
ALP SERPL-CCNC: 66 U/L (ref 30–99)
ALT SERPL-CCNC: 32 U/L (ref 2–50)
ANION GAP SERPL CALC-SCNC: 9 MMOL/L (ref 7–16)
AST SERPL-CCNC: 34 U/L (ref 12–45)
BILIRUB SERPL-MCNC: 0.5 MG/DL (ref 0.1–1.5)
BUN SERPL-MCNC: 13 MG/DL (ref 8–22)
CALCIUM SERPL-MCNC: 9.3 MG/DL (ref 8.5–10.5)
CHLORIDE SERPL-SCNC: 105 MMOL/L (ref 96–112)
CHOLEST SERPL-MCNC: 142 MG/DL (ref 100–199)
CO2 SERPL-SCNC: 24 MMOL/L (ref 20–33)
CREAT SERPL-MCNC: 0.81 MG/DL (ref 0.5–1.4)
GFR SERPLBLD CREATININE-BSD FMLA CKD-EPI: 96 ML/MIN/1.73 M 2
GLOBULIN SER CALC-MCNC: 2.9 G/DL (ref 1.9–3.5)
GLUCOSE SERPL-MCNC: 84 MG/DL (ref 65–99)
HDLC SERPL-MCNC: 52 MG/DL
LDLC SERPL CALC-MCNC: 78 MG/DL
POTASSIUM SERPL-SCNC: 4.6 MMOL/L (ref 3.6–5.5)
PROT SERPL-MCNC: 7 G/DL (ref 6–8.2)
SODIUM SERPL-SCNC: 138 MMOL/L (ref 135–145)
TRIGL SERPL-MCNC: 61 MG/DL (ref 0–149)

## 2022-05-10 PROCEDURE — 84443 ASSAY THYROID STIM HORMONE: CPT

## 2022-05-10 PROCEDURE — 83036 HEMOGLOBIN GLYCOSYLATED A1C: CPT

## 2022-05-10 PROCEDURE — 80061 LIPID PANEL: CPT

## 2022-05-10 PROCEDURE — 80053 COMPREHEN METABOLIC PANEL: CPT

## 2022-05-10 PROCEDURE — 36415 COLL VENOUS BLD VENIPUNCTURE: CPT

## 2022-05-10 NOTE — PROGRESS NOTES
Patient would like to proceed with Dr. Correia's weight management program.  Referral placed today.

## 2022-05-10 NOTE — ASSESSMENT & PLAN NOTE
She fractured her ankle in October 2021.  She has gained 20 pounds since October 2021. Her weight in October was 296 pounds and today her weight is 316 pounds. She has been cleared for 100% at work 1 week ago. She continues with worker's comp for an additional 3 months. She has started going to the gym on the bicycle and upper body training, this has been cleared by her ankle surgeon. She is not interested in stimulant. She is still participating with weight watchers and has been doing 100% for the last 3 months. She is weighing her food with weight watchers. She reports that she is under her points for her weight watchers.

## 2022-05-10 NOTE — PROGRESS NOTES
Subjective:     CC: weight gain    HPI:   Kathi presents today with the following:    Weight gain  She fractured her ankle in October 2021.  She has gained 20 pounds since October 2021. Her weight in October was 296 pounds and today her weight is 316 pounds. She has been cleared for 100% at work 1 week ago. She continues with worker's comp for an additional 3 months. She has started going to the gym on the bicycle and upper body training, this has been cleared by her ankle surgeon. She is not interested in stimulant. She is still participating with weight watchers and has been doing 100% for the last 3 months. She is weighing her food with weight watchers. She reports that she is under her points for her weight watchers.       Past Medical History:   Diagnosis Date   • Asthma    • Blood clotting disorder (HCC) 09/2021    lung   • Psychiatric problem 12/23/2021    anxiety       Social History     Tobacco Use   • Smoking status: Never Smoker   • Smokeless tobacco: Never Used   Vaping Use   • Vaping Use: Never used   Substance Use Topics   • Alcohol use: Yes     Comment: 1-2 per week   • Drug use: Not Currently     Types: Marijuana, Inhaled     Comment: occasionally       Current Outpatient Medications Ordered in Epic   Medication Sig Dispense Refill   • Multiple Vitamins-Minerals (WOMENS MULTI PO) Take  by mouth.     • diclofenac sodium (VOLTAREN) 1 % Gel Apply 2 g topically 4 times a day as needed (for pain/swelling). 100 g 1   • aspirin (ASA) 81 MG Chew Tab chewable tablet Chew 81 mg every day. Indications: Venous Thromboembolism     • hydrOXYzine HCl (ATARAX) 25 MG Tab Take 1 Tablet by mouth 3 times a day as needed for Anxiety. 30 Tablet 2   • melatonin 5 mg Tab Take 5 mg by mouth at bedtime.     • cetirizine (ZYRTEC) 10 MG Tab Take 10 mg by mouth every day.     • albuterol 108 (90 Base) MCG/ACT Aero Soln inhalation aerosol Inhale 1-2 Puffs every four hours as needed for Shortness of Breath. 1 Each 1     No  "current [facility]-ordered facility-administered medications on file.       Allergies:  Amoxicillin and Penicillins    Health Maintenance: Due for COVID booster      Objective:     Vital signs reviewed  Exam:  /82 (BP Location: Right arm, Patient Position: Sitting, BP Cuff Size: Adult)   Pulse 78   Temp 36.7 °C (98 °F) (Temporal)   Ht 1.753 m (5' 9\")   Wt (!) 143 kg (316 lb)   SpO2 100%   BMI 46.67 kg/m²  Body mass index is 46.67 kg/m².    Gen: Alert and oriented, No apparent distress.  Increased body habitus.  Neck: Neck is supple, full ROM.  Lungs: Normal effort, no audible wheezes.  Able to speak in complete sentences.  CV: Skin pink, warm and dry.  Ext: No clubbing, cyanosis, edema.      Assessment & Plan:     36 y.o. female with the following -     1. Weight gain  Chronic exacerbated problem.  She continues to gain weight since her initial ankle fracture in July 2021.  She is continue with her weight watchers.  Plan to check updated labs and refer to nutrition services.  We discussed referral to [facility] for medical weight management program and she is in agreement.  We also discussed referral to Dr. Correia's weight management program which she will research and send me a message if she is interested as well.  - Referral to Nutrition Services  - TSH WITH REFLEX TO FT4; Future    2. Morbid obesity with BMI of 45.0-49.9, adult (HCC)  Chronic exacerbated problem.  Her BMI today is 46.67 kg/m².  We discussed making sure she takes in the correct amount weight watcher points daily.  Encouraged arm exercises, water aerobics and increasing activity as tolerated.  Discussed checking updated labs.  - Referral to Nutrition Services  - Comp Metabolic Panel; Future  - Lipid Profile; Future  - HEMOGLOBIN A1C; Future  - TSH WITH REFLEX TO FT4; Future      Return in about 4 weeks (around 6/6/2022) for weight management, labs .    Please note that this dictation was created using voice recognition software. I have made " every reasonable attempt to correct obvious errors, but I expect that there are errors of grammar and possibly content that I did not discover before finalizing the note.

## 2022-05-11 LAB
EST. AVERAGE GLUCOSE BLD GHB EST-MCNC: 100 MG/DL
HBA1C MFR BLD: 5.1 % (ref 4–5.6)
TSH SERPL DL<=0.005 MIU/L-ACNC: 3 UIU/ML (ref 0.38–5.33)

## 2022-05-29 NOTE — TELEPHONE ENCOUNTER
Future Appointments       Provider Department [place]    5/27/2021 7:00 AM JET Jade [facility] [address] [place]        NEW PATIENT VISIT PRE-VISIT PLANNING    1.  Epic[employer] Patient is checked in Patient Demographics?Yes    2.  Immunizations were updated in Epic using Reconcile Outside Information activity? Yes, recent Covid Iz only         3.  Is this appointment scheduled as a Hospital Follow-Up? No    4.  Patient is due for the following Health Maintenance Topics:   Health Maintenance Due   Topic Date Due   • IMM DTaP/Tdap/Td Vaccine (1 - Tdap) Never done   • PAP SMEAR  Never done     5.  Reviewed/Updated the following with patient:       •   Preferred Pharmacy? Yes       •   Preferred Lab? Yes       •   Preferred Communication? Yes       •   Allergies? Yes       •   Medications? YES. Was Abstract Encounter opened and chart updated? YES       •   Social History? Yes       •   Family History (document living status of immediate family members and if + hx of  cancer, diabetes, hypertension, hyperlipidemia, heart attack, stroke) Yes    6.  Updated Care Team?       •   DME Company (gait device, O2, CPAP, etc.) NO       •   Other Specialists (eye doctor, derm, GYN, cardiology, endo, etc): N\A    7.  AHA (Puls8) form printed for Provider? N/A   Left message for patient to call back regarding pre-visit planning. Please transfer call to [phone].  Patient advised to arrive 15 minutes prior to scheduled appointment     [place]

## 2022-08-01 ENCOUNTER — DOCUMENTATION (OUTPATIENT)
Dept: VASCULAR LAB | Facility: MEDICAL CENTER | Age: 37
End: 2022-08-01
Payer: COMMERCIAL

## 2022-08-02 NOTE — PROGRESS NOTES
Patient here today for nurse blood pressure check. Last dose of BP medication taken:  0430    BP Readings from Last 1 Encounters:   03/10/21 0738 136/68   03/10/21 0728 (!) 150/72     Pulse Readings from Last 1 Encounters:   03/10/21 64     Patient Reported Vitals     There is no flowsheet data to display. Last Clinician Visit for this condition: 3/2/21  Per that visit, plan of care: recheck bp  Next office visit is scheduled for: 6/30/2021    Current BP Medications are: lisinopril/hctz 10/12.5mg daily      Next Nurse BP visit scheduled: No    D/W Dr Lito Alcantar,  BP came down with rest as pt is a heavy smoker and was SOB during initial BP check. 136/68 with rest.  Pt did drink 1/2 pot of coffee this am.  Pt advised of the importance of smoking cessation and decreasing caffeine use. Pt declines at this time.  No changes to BP meds will recheck at next visit Pt scheduled for a vascular visit today but no showed. Left message to call back and let us know if she needs to reschedule. Per chart review she was discharged in April after finishing 6 months of OAC. Awaiting call back.    Dina HANSON

## 2022-08-10 ENCOUNTER — APPOINTMENT (OUTPATIENT)
Dept: VASCULAR LAB | Facility: MEDICAL CENTER | Age: 37
End: 2022-08-10
Attending: INTERNAL MEDICINE
Payer: COMMERCIAL

## 2022-08-18 ENCOUNTER — APPOINTMENT (OUTPATIENT)
Dept: VASCULAR LAB | Facility: MEDICAL CENTER | Age: 37
End: 2022-08-18
Attending: INTERNAL MEDICINE
Payer: COMMERCIAL

## 2022-08-22 ENCOUNTER — ANTICOAGULATION VISIT (OUTPATIENT)
Dept: VASCULAR LAB | Facility: MEDICAL CENTER | Age: 37
End: 2022-08-22
Attending: INTERNAL MEDICINE
Payer: COMMERCIAL

## 2022-08-22 VITALS — HEART RATE: 89 BPM | SYSTOLIC BLOOD PRESSURE: 138 MMHG | DIASTOLIC BLOOD PRESSURE: 87 MMHG

## 2022-08-22 DIAGNOSIS — Z86.711 HISTORY OF PULMONARY EMBOLISM: ICD-10-CM

## 2022-08-22 PROCEDURE — 99213 OFFICE O/P EST LOW 20 MIN: CPT | Performed by: NURSE PRACTITIONER

## 2022-08-22 PROCEDURE — 99212 OFFICE O/P EST SF 10 MIN: CPT | Performed by: NURSE PRACTITIONER

## 2022-08-22 ASSESSMENT — ENCOUNTER SYMPTOMS
CLAUDICATION: 0
SHORTNESS OF BREATH: 0

## 2022-08-22 NOTE — PROGRESS NOTES
VASCULAR MEDICINE CLINIC - F/U VISIT (ANTICOAGULATION)  08/22/22     Kathi Guan is a 36 y.o. female who presents today for vascular follow up.     Subjective    HPI:  Hx of subsegmental bilateral PEs on 9/21/21 which occurred after an ankle injury on 7/23/21.  Pt was NWB, wore a boot and used crutches for several weeks after injury.  Completed 6 months of OAC therapy then aspirin 81 mg daily.  No current SOB or CP.  No LE pain or swelling.  Has right ankle scar tissue for which she is going back to physical therapy.  Has resumed her normal activities.  Is working out at the gym regularly.  No new concerns today but needed a wrap of vascular visit for her worker's comp case.      Past Medical History:   Diagnosis Date    Asthma     Blood clotting disorder (HCC) 09/2021    lung    Psychiatric problem 12/23/2021    anxiety        Past Surgical History:   Procedure Laterality Date    PB ANKLE SCOPE,EXTENS DEBRIDEMNT Right 2/15/2022    Procedure: ANKLE ARTHROSCOPY, OPEN TREATMENT FIBULA NONUNION, LATERAL LIGAMENT REPAIR, PERONEAL REPAIR, POSSIBLE DELTOID REPAIR;  Surgeon: William Abdullahi M.D.;  Location: SURGERY [facility];  Service: Orthopedics    PB REPAIR COLLAT ANKLE LIGMNT,SECONDARY Right 2/15/2022    Procedure: RECONSTRUCTION, LIGAMENT, ANKLE;  Surgeon: William Abdullahi M.D.;  Location: SURGERY [facility];  Service: Orthopedics    PB REPAIR BOTH COLLAT ANKL LIGMT,PBIMRY Right 2/15/2022    Procedure: RECONSTRUCTION, LIGAMENT;  Surgeon: William Abdullahi M.D.;  Location: SURGERY [facility];  Service: Orthopedics    PB REPAIR FLEX LEG TENDON,PBIM,EA Right 2/15/2022    Procedure: REPAIR, TENDON, FLEXOR;  Surgeon: William Abdullahi M.D.;  Location: SURGERY [facility];  Service: Orthopedics    OTHER  2007    lasik eye surgery    OTHER  2005    wisdom teeth removed        Family History   Problem Relation Age of Onset    No Known Problems Mother     Hyperlipidemia Father     Hypertension Father     No  Known Problems Sister     Cancer Paternal Aunt         breast cancer    Diabetes Paternal Uncle     Cancer Maternal Grandmother         bone    Asthma Maternal Grandmother     Arthritis Maternal Grandmother     Dementia Paternal Grandmother     Hypertension Paternal Grandmother     Diabetes Paternal Grandmother     Hyperlipidemia Paternal Grandmother     Stroke Paternal Grandmother     Heart Disease Paternal Grandfather         MI x 4    No Known Problems Half-brother     No Known Problems Son         Social History     Tobacco Use    Smoking status: Never    Smokeless tobacco: Never   Vaping Use    Vaping Use: Never used   Substance Use Topics    Alcohol use: Yes     Comment: 1-2 per week    Drug use: Not Currently     Types: Marijuana, Inhaled     Comment: occasionally        Current Outpatient Medications on File Prior to Visit   Medication Sig Dispense Refill    Multiple Vitamins-Minerals (WOMENS MULTI PO) Take  by mouth.      hydrOXYzine HCl (ATARAX) 25 MG Tab Take 1 Tablet by mouth 3 times a day as needed for Anxiety. 30 Tablet 2    melatonin 5 mg Tab Take 5 mg by mouth at bedtime.      cetirizine (ZYRTEC) 10 MG Tab Take 10 mg by mouth every day.      albuterol 108 (90 Base) MCG/ACT Aero Soln inhalation aerosol Inhale 1-2 Puffs every four hours as needed for Shortness of Breath. 1 Each 1     No current facility-administered medications on file prior to visit.        Amoxicillin and Penicillins     DIET AND EXERCISE:  Weight Change:none  Diet: common adult  Exercise: moderate regular exercise program     Review of Systems   Respiratory:  Negative for shortness of breath.    Cardiovascular:  Negative for chest pain, claudication and leg swelling.          Objective       Objective:     Vitals:    08/22/22 1651   BP: 138/87   Pulse: 89        Physical Exam  Constitutional:       Appearance: Normal appearance. She is obese.   Cardiovascular:      Rate and Rhythm: Normal rate and regular rhythm.      Heart sounds:  Normal heart sounds.   Pulmonary:      Effort: Pulmonary effort is normal.      Breath sounds: Normal breath sounds.   Musculoskeletal:         General: Normal range of motion.   Skin:     General: Skin is warm and dry.   Neurological:      General: No focal deficit present.      Mental Status: She is alert.   Psychiatric:         Mood and Affect: Mood normal.         Behavior: Behavior normal.         Thought Content: Thought content normal.         Judgment: Judgment normal.        DATA REVIEW    Lab Results   Component Value Date/Time    CHOLSTRLTOT 142 05/10/2022 07:35 AM    LDL 78 05/10/2022 07:35 AM    HDL 52 05/10/2022 07:35 AM    TRIGLYCERIDE 61 05/10/2022 07:35 AM       Lab Results   Component Value Date/Time    SODIUM 138 05/10/2022 07:35 AM    POTASSIUM 4.6 05/10/2022 07:35 AM    CHLORIDE 105 05/10/2022 07:35 AM    CO2 24 05/10/2022 07:35 AM    GLUCOSE 84 05/10/2022 07:35 AM    BUN 13 05/10/2022 07:35 AM    CREATININE 0.81 05/10/2022 07:35 AM     Lab Results   Component Value Date/Time    ALKPHOSPHAT 66 05/10/2022 07:35 AM    ASTSGOT 34 05/10/2022 07:35 AM    ALTSGPT 32 05/10/2022 07:35 AM    TBILIRUBIN 0.5 05/10/2022 07:35 AM       INR   Date Value Ref Range Status   09/29/2021 1.17 (H) 0.87 - 1.13 Final     Comment:     Reference range:  INR - Non-therapeutic Reference Range: 0.87-1.13  INR - Therapeutic Reference Range: 2.0-4.0       No results found for: POCINR     9/29/21 ctpa:  IMPRESSION:     1.  There is a subsegmental bilateral lower lobe pulmonary embolism.  2.  Small left pleural effusion and subsegmental left basilar atelectasis.  3.  There is a 2 mm pulmonary nodule in the right upper lobe. In patients with a low risk for lung cancer and without a known malignancy, guidelines by the Fleischner society (Radiology 2005; 237:395-400) suggest that nodules less than or equal to 4 mm   in diameter do not require any further follow-up. In patients with a higher risk, such as those who are smokers, a  follow-up CT is recommended in one year. In patients with a known malignancy, small nodules less than or equal to 5 mm have a low but   measurable risk of representing metastases and should receive follow-up as dictated by the biology of the primary tumor.     9/29/21 rle venous duplex:  IMPRESSION:      1. Limited evaluation due to body habitus. No definite evidence of bilateral lower extremity deep venous thrombosis.    Medical Decision Making:  Today's Assessment / Status / Plan:     History of PE    Indication for anticoagulation: vince subsegmental PEs in the setting or ortho injury/immobility.    Anti-Platelet/Anticoagulant Discussion:  She completed 6 months of OAC for 1st time VTE per ACCP guidelines. VTE likely provoked by ortho injury and subsequent immobility. Completed 4 months of antiplatelet therapy with aspirin. No known ongoing risk factors for recurrent VTE aside from obesity. She is working out regularly and avoiding prolonged sedentary periods. Will have patient stop aspirin therapy. Stressed the importance of close surveillance for s/sx of recurrent VTE which we discussed thoroughly, including when to seek emergent medical attention. Recommend she continue to avoid tobacco, hormones and a sedentary lifestyle. Asked that she let all her providers know she has a hx of VTE, connie if having any surgeries or hospitalizations. Recommend prophylactic anticoagulation for any future surgeries.  She has reached maximum medical improvement (MMI) and is ratable due to the pulmonary emboli.    Anti-Coagulation Plan:  - stop taking aspirin  - go to the ER for shortness of breath, chest pain, pain with deep inhalation, new onset leg swelling and/or pain in calf or leg   - avoid sedentary periods  - continue complete avoidance of tobacco products  - avoid hormonal therapies including estrogen or testosterone-containing meds, or raloxifene or tamoxifene (commonly used for osteoporosis)  - elevate legs as much as  possible, use compression stockings/socks if directed by your provider  - if having any invasive procedure or hospitalization, please make sure the doctor knows of your history of blood clots   - keep up with age-appropriate cancer screenings as a small % of blood clots may be caused by an underlying malignancy    Smoking: continue complete avoidance of all tobacco products    Physical Activity: goal is 3-4 times per week    Instructed to follow-up with PCP for remainder of adult medical needs: yes  We will partner with other provider in the management of established vascular disease and cardiometabolic risk factors    Studies to Be Obtained: none  Labs to Be Obtained: none    Follow up in vascular PRN.    [facility] EXAM 4     Dina HANSON

## 2022-08-22 NOTE — PATIENT INSTRUCTIONS
- stop taking aspirin  - go to the ER for shortness of breath, chest pain, pain with deep inhalation, new onset leg swelling and/or pain in calf or leg   - avoid sedentary periods  - continue complete avoidance of tobacco products  - avoid hormonal therapies including estrogen or testosterone-containing meds, or raloxifene or tamoxifene (commonly used for osteoporosis)  - elevate legs as much as possible, use compression stockings/socks if directed by your provider  - if having any invasive procedure or hospitalization, please make sure the doctor knows of your history of blood clots   - keep up with age-appropriate cancer screenings as a small % of blood clots may be caused by an underlying malignancy

## 2022-12-14 ENCOUNTER — OFFICE VISIT (OUTPATIENT)
Dept: URGENT CARE | Facility: PHYSICIAN GROUP | Age: 37
End: 2022-12-14
Payer: COMMERCIAL

## 2022-12-14 VITALS
RESPIRATION RATE: 20 BRPM | DIASTOLIC BLOOD PRESSURE: 84 MMHG | OXYGEN SATURATION: 97 % | BODY MASS INDEX: 41.95 KG/M2 | HEIGHT: 70 IN | WEIGHT: 293 LBS | HEART RATE: 81 BPM | TEMPERATURE: 98.1 F | SYSTOLIC BLOOD PRESSURE: 130 MMHG

## 2022-12-14 DIAGNOSIS — J45.901 MILD ASTHMA WITH EXACERBATION, UNSPECIFIED WHETHER PERSISTENT: ICD-10-CM

## 2022-12-14 PROCEDURE — 99214 OFFICE O/P EST MOD 30 MIN: CPT

## 2022-12-14 RX ORDER — PREDNISONE 20 MG/1
40 TABLET ORAL DAILY
Qty: 10 TABLET | Refills: 0 | Status: SHIPPED | OUTPATIENT
Start: 2022-12-14 | End: 2022-12-19

## 2022-12-14 RX ORDER — BENZONATATE 100 MG/1
100 CAPSULE ORAL 3 TIMES DAILY PRN
Qty: 30 CAPSULE | Refills: 0 | Status: SHIPPED | OUTPATIENT
Start: 2022-12-14

## 2022-12-14 ASSESSMENT — FIBROSIS 4 INDEX: FIB4 SCORE: 0.72

## 2022-12-14 NOTE — LETTER
December 14, 2022    To Whom It May Concern:         This is confirmation that Kathi Guan attended her scheduled appointment with JET Holcomb on 12/14/22. Please excuse her from work 12/15/22.          If you have any questions please do not hesitate to call me at the phone number listed below.      Sincerely,        CATRINA Holcomb.  [phone]

## 2022-12-15 NOTE — PROGRESS NOTES
Subjective:   Kathi Guan is a 37 y.o. female who presents for Cough (Pt c/o cough, headache, chest tightness x 5 days)      HPI: This is a 38 yo female who presents today for  evaluation of cough.  This is a new problem.  Patient reports developing flulike symptoms last week which resolved.  She reports persistent cough, congestion, wheezing, and chest tightness.  Patient reports associated mild shortness of breath with exertion.  She has used over-the-counter cough medications without any improvement.  She reports underlying history of asthma and has needed to use her inhaler every 3-4 hours which has been somewhat helpful.  She denies fevers, chills, body aches.    Review of Systems   Constitutional:  Negative for chills, fever and malaise/fatigue.   HENT:  Negative for congestion, ear pain, sinus pain and sore throat.    Respiratory:  Positive for cough, shortness of breath and wheezing. Negative for hemoptysis, sputum production and stridor.    Musculoskeletal:  Negative for myalgias.   Neurological:  Positive for headaches.   All other systems reviewed and are negative.    Medications:    Current Outpatient Medications on File Prior to Visit   Medication Sig Dispense Refill    Multiple Vitamins-Minerals (WOMENS MULTI PO) Take  by mouth.      hydrOXYzine HCl (ATARAX) 25 MG Tab Take 1 Tablet by mouth 3 times a day as needed for Anxiety. 30 Tablet 2    cetirizine (ZYRTEC) 10 MG Tab Take 10 mg by mouth every day.      albuterol 108 (90 Base) MCG/ACT Aero Soln inhalation aerosol Inhale 1-2 Puffs every four hours as needed for Shortness of Breath. 1 Each 1    melatonin 5 mg Tab Take 5 mg by mouth at bedtime. (Patient not taking: Reported on 12/14/2022)       No current facility-administered medications on file prior to visit.        Allergies:   Amoxicillin and Penicillins    Problem List:   Patient Active Problem List   Diagnosis    Mild intermittent asthma without complication    Seasonal allergies     "Morbid obesity with BMI of 45.0-49.9, adult (HCC)    Fx ankle, right, closed, initial encounter    Difficulty sleeping    Ankle instability, right    Weight gain        Surgical History:  Past Surgical History:   Procedure Laterality Date    PB ANKLE SCOPE,EXTENS DEBRIDEMNT Right 2/15/2022    Procedure: ANKLE ARTHROSCOPY, OPEN TREATMENT FIBULA NONUNION, LATERAL LIGAMENT REPAIR, PERONEAL REPAIR, POSSIBLE DELTOID REPAIR;  Surgeon: William Abdullahi M.D.;  Location: SURGERY [facility];  Service: Orthopedics    PB REPAIR COLLAT ANKLE LIGMNT,SECONDARY Right 2/15/2022    Procedure: RECONSTRUCTION, LIGAMENT, ANKLE;  Surgeon: William Abdullahi M.D.;  Location: SURGERY [facility];  Service: Orthopedics    PB REPAIR BOTH COLLAT ANKL LIGMT,PBIMRY Right 2/15/2022    Procedure: RECONSTRUCTION, LIGAMENT;  Surgeon: William Abdullahi M.D.;  Location: SURGERY [facility];  Service: Orthopedics    PB REPAIR FLEX LEG TENDON,PBIM,EA Right 2/15/2022    Procedure: REPAIR, TENDON, FLEXOR;  Surgeon: William Abdullahi M.D.;  Location: SURGERY [facility];  Service: Orthopedics    OTHER  2007    lasik eye surgery    OTHER  2005    wisdom teeth removed       Past Social Hx:   Social History     Tobacco Use    Smoking status: Never    Smokeless tobacco: Never   Vaping Use    Vaping Use: Never used   Substance Use Topics    Alcohol use: Not Currently     Comment: On occ    Drug use: Not Currently     Types: Marijuana, Inhaled     Comment: occasionally          Problem list, medications, and allergies reviewed by myself today in Epic.     Objective:     /84 (BP Location: Right arm, Patient Position: Sitting, BP Cuff Size: Large adult)   Pulse 81   Temp 36.7 °C (98.1 °F) (Temporal)   Resp 20   Ht 1.778 m (5' 10\")   Wt (!) 141 kg (311 lb)   LMP 11/23/2022 (Within Days)   SpO2 97%   BMI 44.62 kg/m²     Physical Exam  Vitals and nursing note reviewed.   Constitutional:       General: She is not in acute distress.     Appearance: " Normal appearance. She is not ill-appearing, toxic-appearing or diaphoretic.   HENT:      Head: Normocephalic and atraumatic.      Right Ear: Tympanic membrane, ear canal and external ear normal. There is no impacted cerumen.      Left Ear: Tympanic membrane, ear canal and external ear normal. There is no impacted cerumen.      Nose: Nose normal. No congestion.      Mouth/Throat:      Mouth: Mucous membranes are moist.      Pharynx: Oropharynx is clear. No oropharyngeal exudate or posterior oropharyngeal erythema.   Cardiovascular:      Rate and Rhythm: Normal rate and regular rhythm.      Pulses: Normal pulses.      Heart sounds: Normal heart sounds. No murmur heard.    No friction rub. No gallop.   Pulmonary:      Effort: Pulmonary effort is normal. No respiratory distress.      Breath sounds: No stridor. Wheezing present. No rhonchi or rales.   Chest:      Chest wall: No tenderness.   Musculoskeletal:      Cervical back: Neck supple. No tenderness.   Lymphadenopathy:      Cervical: No cervical adenopathy.   Skin:     General: Skin is warm and dry.      Capillary Refill: Capillary refill takes less than 2 seconds.   Neurological:      General: No focal deficit present.      Mental Status: She is alert and oriented to person, place, and time. Mental status is at baseline.      Cranial Nerves: No cranial nerve deficit.      Motor: No weakness.      Gait: Gait normal.   Psychiatric:         Mood and Affect: Mood normal.         Behavior: Behavior normal.         Thought Content: Thought content normal.         Judgment: Judgment normal.       Assessment/Plan:     Diagnosis and associated orders:   1. Mild asthma with exacerbation, unspecified whether persistent  predniSONE (DELTASONE) 20 MG Tab    benzonatate (TESSALON) 100 MG Cap             Comments/MDM:   Pt is clinically stable at today's acute urgent care visit.  No acute distress noted. Appropriate for outpatient management at this time.     Acute on chronic  problem.  Patient is not ill or toxic appearing clinic today.  Vital signs stable, SPO2 97% on room air, nonlabored breathing.  Patient will be prescribed prednisone for exacerbation of asthma and prescribed Tessalon.  Advised patient to begin taking these medications as they are prescribed, increase oral hydration, begin using humidifier.  She is to return for any new or worsening signs or symptoms and follow-up with PCP for recheck.  Patient is agreeable to plan of care and verbalizes good understanding today.         Discussed DDx, management options (risks,benefits, and alternatives to planned treatment), natural progression and supportive care.  Expressed understanding and the treatment plan was agreed upon. Questions were encouraged and answered   Return to urgent care prn if new or worsening sx or if there is no improvement in condition prn.    Educated in Red flags and indications to immediately call 911 or present to the Emergency Department.   Advised the patient to follow-up with the primary care physician for recheck, reevaluation, and consideration of further management.    I personally reviewed prior external notes and test results pertinent to today's visit.  I have independently reviewed and interpreted all diagnostics ordered during this urgent care acute visit.   Please note that this dictation was created using voice recognition software. I have made a reasonable attempt to correct obvious errors, but I expect that there are errors of grammar and possibly content that I did not discover before finalizing the note.    This note was electronically signed by VALENTIN Lopez

## 2022-12-16 ASSESSMENT — ENCOUNTER SYMPTOMS
SINUS PAIN: 0
HEMOPTYSIS: 0
SPUTUM PRODUCTION: 0
STRIDOR: 0
SHORTNESS OF BREATH: 1
FEVER: 0
WHEEZING: 1
COUGH: 1
SORE THROAT: 0
CHILLS: 0
HEADACHES: 1
MYALGIAS: 0

## 2025-07-27 SDOH — ECONOMIC STABILITY: TRANSPORTATION INSECURITY
IN THE PAST 12 MONTHS, HAS LACK OF RELIABLE TRANSPORTATION KEPT YOU FROM MEDICAL APPOINTMENTS, MEETINGS, WORK OR FROM GETTING THINGS NEEDED FOR DAILY LIVING?: NO

## 2025-07-27 SDOH — ECONOMIC STABILITY: TRANSPORTATION INSECURITY
IN THE PAST 12 MONTHS, HAS THE LACK OF TRANSPORTATION KEPT YOU FROM MEDICAL APPOINTMENTS OR FROM GETTING MEDICATIONS?: NO

## 2025-07-27 SDOH — ECONOMIC STABILITY: FOOD INSECURITY: WITHIN THE PAST 12 MONTHS, YOU WORRIED THAT YOUR FOOD WOULD RUN OUT BEFORE YOU GOT MONEY TO BUY MORE.: NEVER TRUE

## 2025-07-27 SDOH — ECONOMIC STABILITY: INCOME INSECURITY: IN THE LAST 12 MONTHS, WAS THERE A TIME WHEN YOU WERE NOT ABLE TO PAY THE MORTGAGE OR RENT ON TIME?: YES

## 2025-07-27 SDOH — ECONOMIC STABILITY: FOOD INSECURITY: WITHIN THE PAST 12 MONTHS, THE FOOD YOU BOUGHT JUST DIDN'T LAST AND YOU DIDN'T HAVE MONEY TO GET MORE.: NEVER TRUE

## 2025-07-27 SDOH — HEALTH STABILITY: PHYSICAL HEALTH: ON AVERAGE, HOW MANY MINUTES DO YOU ENGAGE IN EXERCISE AT THIS LEVEL?: 30 MIN

## 2025-07-27 SDOH — HEALTH STABILITY: PHYSICAL HEALTH: ON AVERAGE, HOW MANY DAYS PER WEEK DO YOU ENGAGE IN MODERATE TO STRENUOUS EXERCISE (LIKE A BRISK WALK)?: 3 DAYS

## 2025-07-27 SDOH — ECONOMIC STABILITY: INCOME INSECURITY: HOW HARD IS IT FOR YOU TO PAY FOR THE VERY BASICS LIKE FOOD, HOUSING, MEDICAL CARE, AND HEATING?: SOMEWHAT HARD

## 2025-07-27 SDOH — ECONOMIC STABILITY: TRANSPORTATION INSECURITY
IN THE PAST 12 MONTHS, HAS LACK OF TRANSPORTATION KEPT YOU FROM MEETINGS, WORK, OR FROM GETTING THINGS NEEDED FOR DAILY LIVING?: NO

## 2025-07-27 SDOH — ECONOMIC STABILITY: HOUSING INSECURITY
IN THE LAST 12 MONTHS, WAS THERE A TIME WHEN YOU DID NOT HAVE A STEADY PLACE TO SLEEP OR SLEPT IN A SHELTER (INCLUDING NOW)?: YES

## 2025-07-27 SDOH — HEALTH STABILITY: MENTAL HEALTH
STRESS IS WHEN SOMEONE FEELS TENSE, NERVOUS, ANXIOUS, OR CAN'T SLEEP AT NIGHT BECAUSE THEIR MIND IS TROUBLED. HOW STRESSED ARE YOU?: RATHER MUCH

## 2025-07-27 ASSESSMENT — LIFESTYLE VARIABLES
SKIP TO QUESTIONS 9-10: 0
HOW OFTEN DO YOU HAVE SIX OR MORE DRINKS ON ONE OCCASION: LESS THAN MONTHLY
AUDIT-C TOTAL SCORE: 3
HOW OFTEN DO YOU HAVE A DRINK CONTAINING ALCOHOL: 2-4 TIMES A MONTH
HOW MANY STANDARD DRINKS CONTAINING ALCOHOL DO YOU HAVE ON A TYPICAL DAY: 1 OR 2

## 2025-07-27 ASSESSMENT — SOCIAL DETERMINANTS OF HEALTH (SDOH)
HOW OFTEN DO YOU GET TOGETHER WITH FRIENDS OR RELATIVES?: TWICE A WEEK
HOW MANY DRINKS CONTAINING ALCOHOL DO YOU HAVE ON A TYPICAL DAY WHEN YOU ARE DRINKING: 1 OR 2
DO YOU BELONG TO ANY CLUBS OR ORGANIZATIONS SUCH AS CHURCH GROUPS UNIONS, FRATERNAL OR ATHLETIC GROUPS, OR SCHOOL GROUPS?: NO
IN THE PAST 12 MONTHS, HAS THE ELECTRIC, GAS, OIL, OR WATER COMPANY THREATENED TO SHUT OFF SERVICE IN YOUR HOME?: NO
HOW OFTEN DO YOU ATTEND CHURCH OR RELIGIOUS SERVICES?: NEVER
HOW OFTEN DO YOU ATTEND CHURCH OR RELIGIOUS SERVICES?: NEVER
IN A TYPICAL WEEK, HOW MANY TIMES DO YOU TALK ON THE PHONE WITH FAMILY, FRIENDS, OR NEIGHBORS?: THREE TIMES A WEEK
DO YOU BELONG TO ANY CLUBS OR ORGANIZATIONS SUCH AS CHURCH GROUPS UNIONS, FRATERNAL OR ATHLETIC GROUPS, OR SCHOOL GROUPS?: NO
HOW HARD IS IT FOR YOU TO PAY FOR THE VERY BASICS LIKE FOOD, HOUSING, MEDICAL CARE, AND HEATING?: SOMEWHAT HARD
WITHIN THE PAST 12 MONTHS, YOU WORRIED THAT YOUR FOOD WOULD RUN OUT BEFORE YOU GOT THE MONEY TO BUY MORE: NEVER TRUE
HOW OFTEN DO YOU HAVE A DRINK CONTAINING ALCOHOL: 2-4 TIMES A MONTH
HOW OFTEN DO YOU HAVE SIX OR MORE DRINKS ON ONE OCCASION: LESS THAN MONTHLY
IN A TYPICAL WEEK, HOW MANY TIMES DO YOU TALK ON THE PHONE WITH FAMILY, FRIENDS, OR NEIGHBORS?: THREE TIMES A WEEK
HOW OFTEN DO YOU ATTENT MEETINGS OF THE CLUB OR ORGANIZATION YOU BELONG TO?: NEVER
HOW OFTEN DO YOU ATTENT MEETINGS OF THE CLUB OR ORGANIZATION YOU BELONG TO?: NEVER
HOW OFTEN DO YOU GET TOGETHER WITH FRIENDS OR RELATIVES?: TWICE A WEEK

## 2025-07-28 ENCOUNTER — OFFICE VISIT (OUTPATIENT)
Dept: MEDICAL GROUP | Facility: PHYSICIAN GROUP | Age: 40
End: 2025-07-28
Payer: COMMERCIAL

## 2025-07-28 VITALS
OXYGEN SATURATION: 98 % | WEIGHT: 293 LBS | HEIGHT: 70 IN | RESPIRATION RATE: 10 BRPM | BODY MASS INDEX: 41.95 KG/M2 | DIASTOLIC BLOOD PRESSURE: 72 MMHG | SYSTOLIC BLOOD PRESSURE: 108 MMHG | TEMPERATURE: 97.5 F | HEART RATE: 89 BPM

## 2025-07-28 DIAGNOSIS — Z11.59 NEED FOR HEPATITIS C SCREENING TEST: ICD-10-CM

## 2025-07-28 DIAGNOSIS — N96 HISTORY OF RECURRENT MISCARRIAGES: ICD-10-CM

## 2025-07-28 DIAGNOSIS — Z13.21 SCREENING FOR ENDOCRINE, NUTRITIONAL, METABOLIC AND IMMUNITY DISORDER: ICD-10-CM

## 2025-07-28 DIAGNOSIS — J98.01 BRONCHOSPASM: ICD-10-CM

## 2025-07-28 DIAGNOSIS — R53.83 OTHER FATIGUE: ICD-10-CM

## 2025-07-28 DIAGNOSIS — F32.A ANXIETY AND DEPRESSION: Primary | ICD-10-CM

## 2025-07-28 DIAGNOSIS — Z13.0 SCREENING FOR ENDOCRINE, NUTRITIONAL, METABOLIC AND IMMUNITY DISORDER: ICD-10-CM

## 2025-07-28 DIAGNOSIS — Z13.29 SCREENING FOR ENDOCRINE, NUTRITIONAL, METABOLIC AND IMMUNITY DISORDER: ICD-10-CM

## 2025-07-28 DIAGNOSIS — F41.9 ANXIETY AND DEPRESSION: Primary | ICD-10-CM

## 2025-07-28 DIAGNOSIS — Z13.228 SCREENING FOR ENDOCRINE, NUTRITIONAL, METABOLIC AND IMMUNITY DISORDER: ICD-10-CM

## 2025-07-28 DIAGNOSIS — E66.813 OBESITY, CLASS III, BMI 40-49.9 (MORBID OBESITY): ICD-10-CM

## 2025-07-28 DIAGNOSIS — Z11.4 SCREENING FOR HIV (HUMAN IMMUNODEFICIENCY VIRUS): ICD-10-CM

## 2025-07-28 PROCEDURE — 3078F DIAST BP <80 MM HG: CPT

## 2025-07-28 PROCEDURE — 99214 OFFICE O/P EST MOD 30 MIN: CPT

## 2025-07-28 PROCEDURE — 3074F SYST BP LT 130 MM HG: CPT

## 2025-07-28 RX ORDER — BUPROPION HYDROCHLORIDE 300 MG/1
300 TABLET ORAL EVERY MORNING
COMMUNITY
Start: 2024-09-01

## 2025-07-28 RX ORDER — TRAZODONE HYDROCHLORIDE 50 MG/1
50 TABLET ORAL NIGHTLY
COMMUNITY
Start: 2025-05-30

## 2025-07-28 RX ORDER — ALBUTEROL SULFATE 90 UG/1
1-2 INHALANT RESPIRATORY (INHALATION) EVERY 4 HOURS PRN
Qty: 2 EACH | Refills: 3 | Status: SHIPPED | OUTPATIENT
Start: 2025-07-28

## 2025-07-28 RX ORDER — BUSPIRONE HYDROCHLORIDE 10 MG/1
10 TABLET ORAL
COMMUNITY
Start: 2024-10-01

## 2025-07-28 ASSESSMENT — PATIENT HEALTH QUESTIONNAIRE - PHQ9
5. POOR APPETITE OR OVEREATING: 0 - NOT AT ALL
CLINICAL INTERPRETATION OF PHQ2 SCORE: 4
SUM OF ALL RESPONSES TO PHQ QUESTIONS 1-9: 9

## 2025-07-28 NOTE — PROGRESS NOTES
Verbal consent was acquired by the patient to use [employer] ambient listening note generation during this visit     Subjective:     HPI:   History of Present Illness  The patient is a 39-year-old female presenting for the establishment of care.    She has a longstanding history of major depressive disorder, with exacerbation noted over the past two years. She denies suicidal ideation but reports persistent dysphoria. She is currently under the care of a [facility] psychiatrist and has been prescribed bupropion since August 2024 and buspirone since October 2024. Hydroxyzine was discontinued due to fatigue. For sleep disturbances, she takes trazodone 25 mg, half a tablet 3-4 nights per week, as a full tablet induces cephalalgia. Her mental health showed significant improvement with tirzepatide and vitamin B12 in January 2025, but this regimen was discontinued due to financial constraints.    The patient has a history of pulmonary embolism following an ankle injury, treated with apixaban for one month post-surgery, which was subsequently discontinued. She currently reports no dyspnea, ankle pain, or calf pain.    She has regular menstrual cycles and a history of multiple spontaneous abortions, with one living child. She is recently  and not sexually active. No diagnostic workups have been performed for the spontaneous abortions.    The patient expresses concern regarding her weight. She initiated tirzepatide therapy in January 2025 for three months, which was costly. Her depression improved during this period, and she experienced weight loss from 319 lbs in January 2025 to 289 lbs by April 2025, though she has since regained some weight. She is considering resuming tirzepatide or undergoing gastric sleeve surgery.    She reports occasional alcohol consumption, no tobacco use, and occasional marijuana use. She engages in yoga four times a week and walks one mile twice daily.    The patient reports cold  intolerance and digital discoloration in cold weather, suggestive of Raynaud's phenomenon.    She experiences knee pain and morning stiffness in her ankles, which improves with stretching.    She has noticed alopecia.    She has a heat rash on the inside of her leg, which resolves with the application of cortisone.    She requests a refill of albuterol.        Assessment & Plan:     Problem List Items Addressed This Visit       Morbid obesity with BMI of 45.0-49.9, adult (HCC)    Anxiety and depression - Primary    Relevant Medications    buPROPion (WELLBUTRIN XL) 300 MG XL tablet    busPIRone (BUSPAR) 10 MG Tab tablet    traZODone (DESYREL) 50 MG Tab    Other Relevant Orders    CBC WITHOUT DIFFERENTIAL    TSH WITH REFLEX TO FT4    Comp Metabolic Panel     Other Visit Diagnoses         Other fatigue        Relevant Orders    CBC WITHOUT DIFFERENTIAL    IRON    VITAMIN B12    VITAMIN D,25 HYDROXY (DEFICIENCY)    THYROID PEROX AB TPO (REFLEX)      Screening for endocrine, nutritional, metabolic and immunity disorder        Relevant Orders    Lipid Profile      Screening for HIV (human immunodeficiency virus)        Relevant Orders    HIV AG/AB COMBO ASSAY SCREENING      Need for hepatitis C screening test        Relevant Orders    HEP C VIRUS ANTIBODY      History of recurrent miscarriages        Relevant Orders    ANTIPHOSPHOLIPID SYNDROME REFLEX PANEL    APTT    AT-III FUNCTIONAL    FACTOR V LEIDEN MUTATION    FACTOR II DNA ANALYSIS    PROTEIN C FUNCTIONAL    PROTEIN S FUNCTIONAL      Bronchospasm        Relevant Medications    albuterol 108 (90 Base) MCG/ACT Aero Soln inhalation aerosol          Depression Screening    Little interest or pleasure in doing things?  2 - more than half the days   Feeling down, depressed , or hopeless? 2 - more than half the days   Trouble falling or staying asleep, or sleeping too much?  1 - several days   Feeling tired or having little energy?  2 - more than half the days   Poor  appetite or overeating?  0 - not at all   Feeling bad about yourself - or that you are a failure or have let yourself or your family down? 0 - not at all   Trouble concentrating on things, such as reading the newspaper or watching television? 2 - more than half the days   Moving or speaking so slowly that other people could have noticed.  Or the opposite - being so fidgety or restless that you have been moving around a lot more than usual?  0 - not at all   Thoughts that you would be better off dead, or of hurting yourself?  0 - not at all   Patient Health Questionnaire Score: 9       If depressive symptoms identified deferred to follow up visit unless specifically addressed in assesment and plan.    Interpretation of PHQ-9 Total Score   Score Severity   1-4 No Depression   5-9 Mild Depression   10-14 Moderate Depression   15-19 Moderately Severe Depression   20-27 Severe Depression      Assessment & Plan  1. Depression: Chronic and ongoing  - Fluctuating symptoms despite Wellbutrin and buspirone.  - Improved on tirzepatide with B12, discontinued due to cost.  - Comprehensive lab workup: B12, vitamin D, thyroid, iron levels, blood counts.  - B12 injection if vitamin D levels are low.    2. Anxiety: Chronic and ongoing   - Persistent anxiety despite current medications.  - Comprehensive lab workup to identify underlying deficiencies.  - On Wellbutrin since 08/2024 and buspirone since 10/2024.  - Managed by [facility] psychiatrist.    3. Insomnia. This is a chronic, stable medical condition.   - Uses trazodone 25 mg (half tablet) 3-4 nights a week.  - Helps sleep, headache at higher doses.    4. Pulmonary Embolism:   - History of PE, previously on Eliquis. Likely due to immobility following ankle fracture and immobilization.   - No current symptoms.  - Hypercoagulability workup due to history of recurrent miscarriages (5) as well.     5. Recurrent Pregnancy Loss.  - Multiple spontaneous abortions, one living child.  -  Hypercoagulability workup: protein C and S deficiencies, antiphospholipid syndrome.    6. Obesity. Chronic and ongoing   - Interested in resuming tirzepatide, not covered by insurance.  - Lost weight on tirzepatide from 319 lbs to 289 lbs (01/2025-04/2025).  - Considering alternative weight loss options based on lab results.    7. Cold Intolerance. This is a chronic, stable medical condition.   - Reports cold intolerance and finger discoloration.  - Lab workup: thyroid function tests.    8. Hair Loss. This is a chronic, stable medical condition.   - Reports hair loss.  - Lab workup to identify underlying deficiencies.    9. Health Maintenance.  - Last Pap smear in 2021 with HPV cotesting.  - Repeat Pap smear scheduled for 2026.    10. Medication Management.  - Albuterol refill with additional refills for fire season.    Follow-up  - 4-6 weeks.    Return in about 6 weeks (around 9/8/2025) for lab follow up.    Health Maintenance: Orders placed as applicable to patient     Objective:     Exam:  Objective:  Vitals:    07/28/25 0758   BP: 108/72   Pulse: 89   Resp: (!) 10   Temp: 36.4 °C (97.5 °F)   SpO2: 98%     Physical Exam  Physical Exam  Cardiovascular:      Rate and Rhythm: Normal rate and regular rhythm.   Pulmonary:      Effort: Pulmonary effort is normal.      Breath sounds: Normal breath sounds.         Constitutional:       Appearance: Normal appearance.   Eyes:      Extraocular Movements: Extraocular movements intact.   Pulmonary:      Effort: Pulmonary effort is normal.   Neurological:      General: No focal deficit present.      Mental Status: She is alert and oriented to person, place, and time.   Psychiatric:         Mood and Affect: Mood normal.         Behavior: Behavior normal.       Please note that this dictation was created using voice recognition software. I have made every reasonable attempt to correct obvious errors, but I expect that there are errors of grammar and possibly content that I did  not discover before finalizing the note.

## 2025-08-14 ENCOUNTER — HOSPITAL ENCOUNTER (OUTPATIENT)
Dept: LAB | Facility: MEDICAL CENTER | Age: 40
End: 2025-08-14
Payer: COMMERCIAL

## 2025-08-14 DIAGNOSIS — Z13.0 SCREENING FOR ENDOCRINE, NUTRITIONAL, METABOLIC AND IMMUNITY DISORDER: ICD-10-CM

## 2025-08-14 DIAGNOSIS — F32.A ANXIETY AND DEPRESSION: ICD-10-CM

## 2025-08-14 DIAGNOSIS — Z13.29 SCREENING FOR ENDOCRINE, NUTRITIONAL, METABOLIC AND IMMUNITY DISORDER: ICD-10-CM

## 2025-08-14 DIAGNOSIS — Z11.4 SCREENING FOR HIV (HUMAN IMMUNODEFICIENCY VIRUS): ICD-10-CM

## 2025-08-14 DIAGNOSIS — Z13.21 SCREENING FOR ENDOCRINE, NUTRITIONAL, METABOLIC AND IMMUNITY DISORDER: ICD-10-CM

## 2025-08-14 DIAGNOSIS — F41.9 ANXIETY AND DEPRESSION: ICD-10-CM

## 2025-08-14 DIAGNOSIS — Z13.228 SCREENING FOR ENDOCRINE, NUTRITIONAL, METABOLIC AND IMMUNITY DISORDER: ICD-10-CM

## 2025-08-14 DIAGNOSIS — N96 HISTORY OF RECURRENT MISCARRIAGES: ICD-10-CM

## 2025-08-14 DIAGNOSIS — Z11.59 NEED FOR HEPATITIS C SCREENING TEST: ICD-10-CM

## 2025-08-14 DIAGNOSIS — R53.83 OTHER FATIGUE: ICD-10-CM

## 2025-08-14 LAB
25(OH)D3 SERPL-MCNC: 23 NG/ML (ref 30–100)
ALBUMIN SERPL BCP-MCNC: 4.3 G/DL (ref 3.2–4.9)
ALBUMIN/GLOB SERPL: 1.7 G/DL
ALP SERPL-CCNC: 68 U/L (ref 30–99)
ALT SERPL-CCNC: 10 U/L (ref 2–50)
ANION GAP SERPL CALC-SCNC: 11 MMOL/L (ref 7–16)
APTT PPP: 29.9 SEC (ref 24.7–36)
AST SERPL-CCNC: 17 U/L (ref 12–45)
BILIRUB SERPL-MCNC: 0.4 MG/DL (ref 0.1–1.5)
BUN SERPL-MCNC: 15 MG/DL (ref 8–22)
CALCIUM ALBUM COR SERPL-MCNC: 8.8 MG/DL (ref 8.5–10.5)
CALCIUM SERPL-MCNC: 9 MG/DL (ref 8.5–10.5)
CHLORIDE SERPL-SCNC: 107 MMOL/L (ref 96–112)
CHOLEST SERPL-MCNC: 128 MG/DL (ref 100–199)
CO2 SERPL-SCNC: 22 MMOL/L (ref 20–33)
CREAT SERPL-MCNC: 0.91 MG/DL (ref 0.5–1.4)
ERYTHROCYTE [DISTWIDTH] IN BLOOD BY AUTOMATED COUNT: 47.1 FL (ref 35.9–50)
GFR SERPLBLD CREATININE-BSD FMLA CKD-EPI: 82 ML/MIN/1.73 M 2
GLOBULIN SER CALC-MCNC: 2.6 G/DL (ref 1.9–3.5)
GLUCOSE SERPL-MCNC: 81 MG/DL (ref 65–99)
HCT VFR BLD AUTO: 45.1 % (ref 37–47)
HCV AB SER QL: NORMAL
HDLC SERPL-MCNC: 46 MG/DL
HGB BLD-MCNC: 14.4 G/DL (ref 12–16)
HIV 1+2 AB+HIV1 P24 AG SERPL QL IA: NORMAL
IRON SERPL-MCNC: 55 UG/DL (ref 40–170)
LDLC SERPL CALC-MCNC: 70 MG/DL
MCH RBC QN AUTO: 28.6 PG (ref 27–33)
MCHC RBC AUTO-ENTMCNC: 31.9 G/DL (ref 32.2–35.5)
MCV RBC AUTO: 89.7 FL (ref 81.4–97.8)
PLATELET # BLD AUTO: 293 K/UL (ref 164–446)
PMV BLD AUTO: 11.5 FL (ref 9–12.9)
POTASSIUM SERPL-SCNC: 4.3 MMOL/L (ref 3.6–5.5)
PROT SERPL-MCNC: 6.9 G/DL (ref 6–8.2)
RBC # BLD AUTO: 5.03 M/UL (ref 4.2–5.4)
SODIUM SERPL-SCNC: 140 MMOL/L (ref 135–145)
THYROPEROXIDASE AB SERPL-ACNC: 9.9 IU/ML (ref 0–9)
TRIGL SERPL-MCNC: 61 MG/DL (ref 0–149)
TSH SERPL DL<=0.005 MIU/L-ACNC: 2.27 UIU/ML (ref 0.38–5.33)
VIT B12 SERPL-MCNC: 379 PG/ML (ref 211–911)
WBC # BLD AUTO: 5.1 K/UL (ref 4.8–10.8)

## 2025-08-14 PROCEDURE — 85730 THROMBOPLASTIN TIME PARTIAL: CPT

## 2025-08-14 PROCEDURE — 82306 VITAMIN D 25 HYDROXY: CPT

## 2025-08-14 PROCEDURE — 86146 BETA-2 GLYCOPROTEIN ANTIBODY: CPT

## 2025-08-14 PROCEDURE — 81241 F5 GENE: CPT

## 2025-08-14 PROCEDURE — 82607 VITAMIN B-12: CPT

## 2025-08-14 PROCEDURE — 85610 PROTHROMBIN TIME: CPT

## 2025-08-14 PROCEDURE — 85303 CLOT INHIBIT PROT C ACTIVITY: CPT

## 2025-08-14 PROCEDURE — 87389 HIV-1 AG W/HIV-1&-2 AB AG IA: CPT

## 2025-08-14 PROCEDURE — 85306 CLOT INHIBIT PROT S FREE: CPT

## 2025-08-14 PROCEDURE — 83540 ASSAY OF IRON: CPT

## 2025-08-14 PROCEDURE — 86147 CARDIOLIPIN ANTIBODY EA IG: CPT | Mod: 91

## 2025-08-14 PROCEDURE — 86376 MICROSOMAL ANTIBODY EACH: CPT

## 2025-08-14 PROCEDURE — 84443 ASSAY THYROID STIM HORMONE: CPT

## 2025-08-14 PROCEDURE — 80053 COMPREHEN METABOLIC PANEL: CPT

## 2025-08-14 PROCEDURE — 85300 ANTITHROMBIN III ACTIVITY: CPT

## 2025-08-14 PROCEDURE — 86803 HEPATITIS C AB TEST: CPT

## 2025-08-14 PROCEDURE — 81240 F2 GENE: CPT

## 2025-08-14 PROCEDURE — 80061 LIPID PANEL: CPT

## 2025-08-14 PROCEDURE — 85613 RUSSELL VIPER VENOM DILUTED: CPT | Mod: 91

## 2025-08-14 PROCEDURE — 85027 COMPLETE CBC AUTOMATED: CPT

## 2025-08-14 PROCEDURE — 36415 COLL VENOUS BLD VENIPUNCTURE: CPT

## 2025-08-16 LAB
AT III ACT/NOR PPP CHRO: 105 % (ref 76–128)
PROT C ACT/NOR PPP: 102 % (ref 83–168)
PROT S ACT/NOR PPP: 95 % (ref 57–131)

## 2025-08-17 LAB
B2 GLYCOPROT1 IGG SERPL IA-ACNC: <10 SGU
B2 GLYCOPROT1 IGM SERPL IA-ACNC: <10 SMU
CARDIOLIPIN IGG SER IA-ACNC: <10 GPL
CARDIOLIPIN IGM SER IA-ACNC: <10 MPL

## 2025-08-20 LAB
APTT SCREEN TO CONFIRM RATIO: 1.03 {RATIO}
CONFIRM DRVVT STA-STACLOT: NORMAL S
DRVVT SCREEN TO CONFIRM RATIO: 1.2 {RATIO}
DRVVT SCREEN TO CONFIRM RATIO: NORMAL {RATIO}
DRVVT/DRVVT CFM P DOAC NEUT NORM PPP-RTO: NORMAL {RATIO}
F2 C.20210G>A GENO BLD/T: NEGATIVE
F5 P.R506Q BLD/T QL: NEGATIVE
HEPARIN NT PPP QL: NORMAL
LA 3 SCREEN W REFLEX-IMP: NORMAL
LMW HEPARIN IND PLT AB SER QL: NORMAL
MIXING DRVVT: NORMAL S
PROTHROMBIN TIME: 14 S (ref 12–15.5)
SCREEN APTT: NORMAL S
TESTING SUMMARY NL11779: NORMAL
THROMBIN TIME: NORMAL S

## (undated) DEVICE — PAD LAP STERILE 18 X 18 - (5/PK 40PK/CA)

## (undated) DEVICE — NEPTUNE 4 PORT MANIFOLD - (20/PK)

## (undated) DEVICE — PACK LOWER EXTREMITY - (2/CA)

## (undated) DEVICE — TUBING PUMP WITH CONNECTOR REDEUCE (1EA)

## (undated) DEVICE — TOWEL STOP TIMEOUT SAFETY FLAG (40EA/CA)

## (undated) DEVICE — NEEDLE W/FACET S TIP ECHOGENIC W/STIMULATION CABLE SONOPLEX II 21G X 4IN (10EA/BX)

## (undated) DEVICE — SUCTION INSTRUMENT YANKAUER BULBOUS TIP W/O VENT (50EA/CA)

## (undated) DEVICE — GLOVE BIOGEL INDICATOR SZ 8.5 SURGICAL PF LTX - (50/BX 4BX/CA)

## (undated) DEVICE — SUTURE 2-0 MONOCRYL CT-1

## (undated) DEVICE — LACTATED RINGERS INJ 1000 ML - (14EA/CA 60CA/PF)

## (undated) DEVICE — SENSOR SPO2 NEO LNCS ADHESIVE (20/BX) SEE USER NOTES

## (undated) DEVICE — ELECTRODE DUAL RETURN W/ CORD - (50/PK)

## (undated) DEVICE — KIT ANESTHESIA W/CIRCUIT & 3/LT BAG W/FILTER (20EA/CA)

## (undated) DEVICE — SUTURE TAPE 1.3 MM (MUST ORDER 12 EA AT A TIME)

## (undated) DEVICE — DRAPE LARGE 3 QUARTER - (20/CA)

## (undated) DEVICE — SUTURE 3-0 ETHILON PS-1 (36PK/BX)

## (undated) DEVICE — GLOVE BIOGEL SZ 8 SURGICAL PF LTX - (50PR/BX 4BX/CA)

## (undated) DEVICE — SET EXTENSION WITH 2 PORTS (48EA/CA) ***PART #2C8610 IS A SUBSTITUTE*****

## (undated) DEVICE — NEEDLE SAFETY 18 GA X 1 1/2 IN (100EA/BX)

## (undated) DEVICE — HEAD HOLDER JUNIOR/ADULT

## (undated) DEVICE — DRESSING 3X3 ADAPTIC GAUZE - (50EA/CT)

## (undated) DEVICE — WRAP CO-FLEX 4IN X 5YD STERIL - SELF-ADHERENT (18/CA)

## (undated) DEVICE — MASK, LARYNGEAL AIRWAY #4

## (undated) DEVICE — SUTURE 3-0 MONOCRYL PLUS PS-1 - 27 INCH (36/BX)

## (undated) DEVICE — SODIUM CHL. IRRIGATION 0.9% 3000ML (4EA/CA 65CA/PF)

## (undated) DEVICE — GLOVE BIOGEL SZ 7 SURGICAL PF LTX - (50PR/BX 4BX/CA)

## (undated) DEVICE — SUTURE GENERAL

## (undated) DEVICE — CANISTER SUCTION 3000ML MECHANICAL FILTER AUTO SHUTOFF MEDI-VAC NONSTERILE LF DISP  (40EA/CA)

## (undated) DEVICE — PADDING CAST 6 IN STERILE - 6 X 4 YDS (24/CA)

## (undated) DEVICE — STOCKINET BIAS 6 IN STERILE - (20/CA)

## (undated) DEVICE — GOWN SURGEONS X-LARGE - DISP. (30/CA)

## (undated) DEVICE — ELECTRODE 850 FOAM ADHESIVE - HYDROGEL RADIOTRNSPRNT (50/PK)

## (undated) DEVICE — SODIUM CHL IRRIGATION 0.9% 1000ML (12EA/CA)

## (undated) DEVICE — SLEEVE, VASO, THIGH, MED

## (undated) DEVICE — GLOVE BIOGEL PI ORTHO SZ 7 PF LF (40PR/BX)

## (undated) DEVICE — SPLINT PLASTER 5 IN X 30 IN - (50EA/BX 6BX/CA)

## (undated) DEVICE — GLOVE BIOGEL PI ORTHO SZ 7.5 PF LF (40PR/BX)

## (undated) DEVICE — GOWN WARMING STANDARD FLEX - (30/CA)

## (undated) DEVICE — GLOVE BIOGEL PI INDICATOR SZ 8.0 SURGICAL PF LF -(50/BX 4BX/CA)

## (undated) DEVICE — TUBING CLEARLINK DUO-VENT - C-FLO (48EA/CA)

## (undated) DEVICE — BLADE SURGICAL #15 - (50/BX 3BX/CA)

## (undated) DEVICE — SHAVER 3.5 SM JT AGGRES.MEN. (5EA/BX)

## (undated) DEVICE — GLOVE SZ 8 BIOGEL PI MICRO - PF LF (50PR/BX)

## (undated) DEVICE — MASK ANESTHESIA ADULT  - (100/CA)

## (undated) DEVICE — DRESSING 3X8 ADAPTIC GAUZE - NON-ADHERING (36/PK 6PK/BX)

## (undated) DEVICE — PROTECTOR ULNA NERVE - (36PR/CA)

## (undated) DEVICE — TUBING PATIENT W/CONNECTOR REDEUCE (1EA)

## (undated) DEVICE — SUTURE 3-0 MONOCRYL SH (36PK/BX)

## (undated) DEVICE — DRESSING ABDOMINAL PAD STERILE 8 X 10" (360EA/CA)"

## (undated) DEVICE — GLOVE BIOGEL INDICATOR SZ 7SURGICAL PF LTX - (50/BX 4BX/CA)

## (undated) DEVICE — TOURNIQUET CUFF 34 X 4 ONE PORT DISP - STERILE (10/BX)

## (undated) DEVICE — SET LEADWIRE 5 LEAD BEDSIDE DISPOSABLE ECG (1SET OF 5/EA)

## (undated) DEVICE — GLOVE BIOGEL PI INDICATOR SZ 8.5 SURGICAL PF LF - (50PR/BX 4BX/CA)